# Patient Record
Sex: MALE | Race: BLACK OR AFRICAN AMERICAN | NOT HISPANIC OR LATINO | Employment: FULL TIME | URBAN - METROPOLITAN AREA
[De-identification: names, ages, dates, MRNs, and addresses within clinical notes are randomized per-mention and may not be internally consistent; named-entity substitution may affect disease eponyms.]

---

## 2020-05-08 ENCOUNTER — TELEPHONE (OUTPATIENT)
Dept: ORTHOPEDICS | Facility: CLINIC | Age: 50
End: 2020-05-08

## 2020-05-18 ENCOUNTER — TELEPHONE (OUTPATIENT)
Dept: ORTHOPEDICS | Facility: CLINIC | Age: 50
End: 2020-05-18

## 2020-05-18 NOTE — TELEPHONE ENCOUNTER
We spoke  Told him I got the [employer] entered with no problem  Dr Lang will see him June 5  @ 8 AM  Unable to do May 29   He was ok with that   Slip sent

## 2020-05-19 ENCOUNTER — TELEPHONE (OUTPATIENT)
Dept: ORTHOPEDICS | Facility: CLINIC | Age: 50
End: 2020-05-19

## 2020-05-19 NOTE — TELEPHONE ENCOUNTER
----- Message from Latosha Manriquez LPN sent at 5/19/2020  9:47 AM CDT -----  Contact: self      ----- Message -----  From: Rene Velarde  Sent: 5/19/2020   9:26 AM CDT  To: Rickey Holbrook Staff    Mg Pt need nurse to give him a call concerning having return back to work papers filled out    Contact    he needed my fax # , I told him to fill out his part   He said ok

## 2020-05-28 ENCOUNTER — TELEPHONE (OUTPATIENT)
Dept: ORTHOPEDICS | Facility: CLINIC | Age: 50
End: 2020-05-28

## 2020-05-28 NOTE — TELEPHONE ENCOUNTER
----- Message from Latosha Manriquez LPN sent at 5/27/2020  1:17 PM CDT -----  Contact: self  Spoke with Jaden Reina and he said his job did fax paperwork to the number you provided. I let him know that you will be in tomorrow and can follow up with him, then.   Thank you,  Latosha  [phone]  ----- Message -----  From: Paz Hunt  Sent: 5/27/2020  12:07 PM CDT  To: Rickey Holbrook Staff     Pt is asking for a call back in regards to paper work that was faxed to the office from his job     Contact info- [phone]      we spoke  Informed I DID get his papers but I dont see Dr Lang until & only on Fridays IF he doesn't have lengtheny surgeries   He understood...  I do see him tomorrow

## 2020-06-05 ENCOUNTER — OFFICE VISIT (OUTPATIENT)
Dept: ORTHOPEDICS | Facility: CLINIC | Age: 50
End: 2020-06-05
Payer: COMMERCIAL

## 2020-06-05 VITALS
WEIGHT: 315 LBS | HEART RATE: 90 BPM | DIASTOLIC BLOOD PRESSURE: 80 MMHG | TEMPERATURE: 98 F | BODY MASS INDEX: 44.1 KG/M2 | SYSTOLIC BLOOD PRESSURE: 155 MMHG | HEIGHT: 71 IN

## 2020-06-05 DIAGNOSIS — D48.19 NEOPLASM OF UNCERTAIN BEHAVIOR OF CONNECTIVE AND OTHER SOFT TISSUE: Primary | ICD-10-CM

## 2020-06-05 DIAGNOSIS — R22.42 MASS OF LEFT THIGH: ICD-10-CM

## 2020-06-05 DIAGNOSIS — R26.9 ALTERED GAIT: ICD-10-CM

## 2020-06-05 PROCEDURE — 99999 PR PBB SHADOW E&M-EST. PATIENT-LVL III: ICD-10-PCS | Mod: PBBFAC,,, | Performed by: ORTHOPAEDIC SURGERY

## 2020-06-05 PROCEDURE — 88307 PR  SURG PATH,LEVEL V: ICD-10-PCS | Mod: 26,,, | Performed by: PATHOLOGY

## 2020-06-05 PROCEDURE — 20206 BIOPSY MUSCLE PERQ NEEDLE: CPT | Mod: S$GLB,,, | Performed by: ORTHOPAEDIC SURGERY

## 2020-06-05 PROCEDURE — 88305 TISSUE EXAM BY PATHOLOGIST: CPT | Performed by: PATHOLOGY

## 2020-06-05 PROCEDURE — 99215 OFFICE O/P EST HI 40 MIN: CPT | Mod: 25,S$GLB,, | Performed by: ORTHOPAEDIC SURGERY

## 2020-06-05 PROCEDURE — 20206 PR NEEDLE BIOPSY,MUSCLE: ICD-10-PCS | Mod: S$GLB,,, | Performed by: ORTHOPAEDIC SURGERY

## 2020-06-05 PROCEDURE — 88307 TISSUE EXAM BY PATHOLOGIST: CPT | Mod: 26,,, | Performed by: PATHOLOGY

## 2020-06-05 PROCEDURE — 99215 PR OFFICE/OUTPT VISIT, EST, LEVL V, 40-54 MIN: ICD-10-PCS | Mod: 25,S$GLB,, | Performed by: ORTHOPAEDIC SURGERY

## 2020-06-05 PROCEDURE — 99999 PR PBB SHADOW E&M-EST. PATIENT-LVL III: CPT | Mod: PBBFAC,,, | Performed by: ORTHOPAEDIC SURGERY

## 2020-06-05 NOTE — PROGRESS NOTES
Clinic Note  Orthopaedics    SUBJECTIVE:     History of Present Illness:  Patient is a 49 y.o. male here today to be evaluated for left posterior medial thigh mass.  States that this 1st noticed approximately 6-8 months ago.  He was at work and his coworkers notice that his left leg with more swollen than his right.  Since then he notes that the mass has been slowly growing however has not been causing any pain.  Denies distal numbness, tingling.  Denies paresthesias in the sciatic nerve distribution, states that he has had mild paresthesias anteriorly over the lateral femoral cutaneous nerve distribution.  Denies fevers, chills, malaise, body aches, weight loss, change in appetite, bowel or bladder changes.  States that he does not have any known family history of sarcoma however he will have to discuss this with his family.  He currently works for the [employer].  No other history of personal cancers.  History of hypertension however otherwise no major medical conditions.  He is a nonsmoker, does drink occasional alcohol.    Scheduled Meds:  Continuous Infusions:  PRN Meds:    Review of patient's allergies indicates:  No Known Allergies    Past Medical History:   Diagnosis Date    Hypertension      Past Surgical History:   Procedure Laterality Date    HERNIA REPAIR       No family history on file.  Social History     Tobacco Use    Smoking status: Never Smoker    Smokeless tobacco: Never Used   Substance Use Topics    Alcohol use: Yes     Comment: 1-2 week    Drug use: No        Review of Systems:  Patient denies constitutional symptoms, cardiac symptoms, respiratory symptoms, GI symptoms.  The remainder of the musculoskeletal ROS is included in the HPI.      OBJECTIVE:     Vital Signs (Most Recent)  Temp: 97.5 °F (36.4 °C) (06/05/20 0754)  Pulse: 90 (06/05/20 0754)  BP: (!) 155/80 (06/05/20 0754)    Physical Exam:  Gen:  No acute distress  CV:  Peripherally well-perfused.  Pulses 2+  bilaterally.  Lungs:  Normal respiratory effort.  Abdomen:  Soft, non-tender, non-distended  Head/Neck:  Normocephalic.  Atraumatic. No TTP, AROM and PROM intact without pain  Neuro:  CN intact without deficit, SILT throughout B/L Upper & Lower Extremities      Physical Exam:  Gen:  No acute distress  CV:  Peripherally well-perfused.  Pulses 2+ bilaterally.  Lungs:  Normal respiratory effort.  Abdomen:  Soft, non-tender, non-distended  Head/Neck:  Normocephalic.  Atraumatic. No TTP, AROM and PROM intact without pain  Neuro:  CN intact without deficit, SILT throughout B/L Upper & Lower Extremities  Pelvis: No TTP, Stable to direct anterior pressure over ASIS.      Left LOWER EXTREMITY    INSPECTION  - There is a greater than 20 cm mass noted over the posterior medial aspect of the left thigh.  Spans from the groin to the distal 1/3 of the femur.  This approximately size of a watermelon.  No skin blanching  PALPATION  - On palpation the mass is firm, dense, affixed to this the the deeper structures of the thigh. It is non mobile.  skin is not densely adherent to the mass.  RANGE OF MOTION  - AROM and PROM intact at the knee, hip, ankle painless.  NEUROVASCULAR  - TA/EHL/Gastroc/FHL assessed in isolation without deficit  - SILT throughout  - DP and PT palpated  2+  - Capillary Refill <3s            Laboratory:  No results found for this or any previous visit (from the past 72 hour(s)).    Diagnostic Results:  X-Ray: Reviewed     MRI reviewed showing a greater than 20 cm mass in the medial compartment of the thigh.  Proximally there does appear to be an area of the mass that is primarily undifferentiated lipoma however distally the mass does appear to be de differentiated.  Appears to be primarily located within the adductor brandi and displaces the semimembranosus, semitendinosus laterally.     ASSESSMENT/PLAN:     A/P: Tito Stanley is a 49 y.o. with left posteromedial thigh mass.  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  Core biopsy taken today in the clinic, was able to obtain 10-12 good core biopsy samples which will confirm diagnosis.  Discussed with patient that he will most certainly need surgery for this however it is unclear the timing at this time.  If this does appear to be the diagnosis we expect will plan for radiation and eventual surgery likely in August.  Will order CT chest abdomen and pelvis with contrast for staging he will get this over the next 2 weeks and follow up in 2 weeks to discuss his biopsy results.  Lab work ordered for contrast CT.  [facility] paperwork was filled out today.        Jayce Diop M.D. PGY3  Orthopaedic Surgery

## 2020-06-11 ENCOUNTER — TELEPHONE (OUTPATIENT)
Dept: ORTHOPEDICS | Facility: CLINIC | Age: 50
End: 2020-06-11

## 2020-06-11 NOTE — TELEPHONE ENCOUNTER
We spoke  I had to move his follow up appt to July 10   His biopsy is this Saturday June 13  I'll see if Dr Lang can call him after the results of the BX are available

## 2020-06-13 ENCOUNTER — HOSPITAL ENCOUNTER (OUTPATIENT)
Dept: RADIOLOGY | Facility: HOSPITAL | Age: 50
Discharge: HOME | End: 2020-06-13
Attending: ORTHOPAEDIC SURGERY
Payer: COMMERCIAL

## 2020-06-13 DIAGNOSIS — D48.19 NEOPLASM OF UNCERTAIN BEHAVIOR OF CONNECTIVE AND OTHER SOFT TISSUE: ICD-10-CM

## 2020-06-13 PROCEDURE — 74177 CT ABD & PELVIS W/CONTRAST: CPT | Mod: 26,,, | Performed by: RADIOLOGY

## 2020-06-13 PROCEDURE — 25500020 PHARM REV CODE 255: Performed by: ORTHOPAEDIC SURGERY

## 2020-06-13 PROCEDURE — 71260 CT CHEST ABDOMEN PELVIS WITH CONTRAST (XPD): ICD-10-PCS | Mod: 26,,, | Performed by: RADIOLOGY

## 2020-06-13 PROCEDURE — 71260 CT THORAX DX C+: CPT | Mod: 26,,, | Performed by: RADIOLOGY

## 2020-06-13 PROCEDURE — 74177 CT CHEST ABDOMEN PELVIS WITH CONTRAST (XPD): ICD-10-PCS | Mod: 26,,, | Performed by: RADIOLOGY

## 2020-06-13 PROCEDURE — 74177 CT ABD & PELVIS W/CONTRAST: CPT | Mod: TC

## 2020-06-13 RX ADMIN — IOHEXOL 100 ML: 350 INJECTION, SOLUTION INTRAVENOUS at 11:06

## 2020-06-13 RX ADMIN — IOHEXOL 15 ML: 350 INJECTION, SOLUTION INTRAVENOUS at 10:06

## 2020-06-13 RX ADMIN — IOHEXOL 15 ML: 350 INJECTION, SOLUTION INTRAVENOUS at 09:06

## 2020-06-15 LAB
FINAL PATHOLOGIC DIAGNOSIS: NORMAL
GROSS: NORMAL

## 2020-06-19 NOTE — PROGRESS NOTES
I have reviewed the notes, assessments, and/or procedures performed by Jayce Diop, I concur with her/his documentation of Gladwin Jaden.     Imaging shows a large fatty component of the tumor with a distinct separate enhancing non-lipomatous area, this is highly suspicious for dedifferentiated liposarcoma.    Start staging workup with imaging, and biopsy to be done today.  Anticipate neoadjuvant radiation, resection, given large size >10cm, chemotherapy can be considered.    See back in 4 weeks to discuss pathology results, imaging, and surgical details.    Core needle biopsy procedure note:  Performed with [employer] 16G biopsy needle and 14G trochar using direct posterior approach with patient lying prone.  Skin prepared using sterile technique, and analgesia provided with subcutaneous lidocaine 1% injected using 25G needle.  Several trajectories used, minimal resistance within the tumor, well formed white tissue cores were obtained 10-12 in all and placed in formalin and sent to Pathology.  Pressure applied for 2 minutes, hemostasis obtained, dressing applied.  Patient tolerated procedure well.    I spent more than 60 minutes reviewing this patient's medical records, imaging studies, and taking a full history and physical, and discussing his treatment plan and expected prognosis, and performing core needle biopsy procedure.  More than 50% of this time was spent face to face with the patient.

## 2020-07-07 ENCOUNTER — TELEPHONE (OUTPATIENT)
Dept: ORTHOPEDICS | Facility: CLINIC | Age: 50
End: 2020-07-07

## 2020-07-07 NOTE — TELEPHONE ENCOUNTER
----- Message from Latosha Manriquez LPN sent at 7/2/2020  3:23 PM CDT -----  Contact: Patient @[phone]  [place] -  Mr. Stanley said that the paperwork Dr. Lang filled out has to be redone. Mr. Stanley is going to have a new form faxed and I will give it to you upon receipt. He has an upcoming appt with .  Thank you,Latosha  ----- Message -----  From: Alejo Hood  Sent: 7/2/2020   3:13 PM CDT  To: Rickey Holbrook Staff    Patient requesting a return call regarding the [facility] paperwork, pls call to discuss further                   Informed to bring the papers Friday when he comes in , he said OK

## 2020-07-10 ENCOUNTER — TELEPHONE (OUTPATIENT)
Dept: ORTHOPEDICS | Facility: CLINIC | Age: 50
End: 2020-07-10

## 2020-07-10 ENCOUNTER — HOSPITAL ENCOUNTER (OUTPATIENT)
Dept: CARDIOLOGY | Facility: CLINIC | Age: 50
Discharge: HOME | End: 2020-07-10
Payer: COMMERCIAL

## 2020-07-10 ENCOUNTER — OFFICE VISIT (OUTPATIENT)
Dept: ORTHOPEDICS | Facility: CLINIC | Age: 50
End: 2020-07-10
Payer: COMMERCIAL

## 2020-07-10 ENCOUNTER — HOSPITAL ENCOUNTER (OUTPATIENT)
Dept: RADIOLOGY | Facility: HOSPITAL | Age: 50
Discharge: HOME | End: 2020-07-10
Attending: ORTHOPAEDIC SURGERY
Payer: COMMERCIAL

## 2020-07-10 VITALS
DIASTOLIC BLOOD PRESSURE: 77 MMHG | BODY MASS INDEX: 44.1 KG/M2 | HEART RATE: 84 BPM | TEMPERATURE: 97 F | SYSTOLIC BLOOD PRESSURE: 148 MMHG | HEIGHT: 71 IN | WEIGHT: 315 LBS

## 2020-07-10 DIAGNOSIS — C49.22 SOFT TISSUE SARCOMA OF LEFT LOWER EXTREMITY: ICD-10-CM

## 2020-07-10 DIAGNOSIS — C49.22 SOFT TISSUE SARCOMA OF LEFT LOWER EXTREMITY: Primary | ICD-10-CM

## 2020-07-10 PROCEDURE — 93005 ELECTROCARDIOGRAM TRACING: CPT | Mod: S$GLB,,, | Performed by: ORTHOPAEDIC SURGERY

## 2020-07-10 PROCEDURE — 71046 XR CHEST PA AND LATERAL: ICD-10-PCS | Mod: 26,,, | Performed by: RADIOLOGY

## 2020-07-10 PROCEDURE — 99214 PR OFFICE/OUTPT VISIT, EST, LEVL IV, 30-39 MIN: ICD-10-PCS | Mod: S$GLB,,, | Performed by: ORTHOPAEDIC SURGERY

## 2020-07-10 PROCEDURE — 93010 EKG 12-LEAD: ICD-10-PCS | Mod: S$GLB,,, | Performed by: INTERNAL MEDICINE

## 2020-07-10 PROCEDURE — 99999 PR PBB SHADOW E&M-EST. PATIENT-LVL III: CPT | Mod: PBBFAC,,, | Performed by: ORTHOPAEDIC SURGERY

## 2020-07-10 PROCEDURE — 99999 PR PBB SHADOW E&M-EST. PATIENT-LVL III: ICD-10-PCS | Mod: PBBFAC,,, | Performed by: ORTHOPAEDIC SURGERY

## 2020-07-10 PROCEDURE — 99214 OFFICE O/P EST MOD 30 MIN: CPT | Mod: S$GLB,,, | Performed by: ORTHOPAEDIC SURGERY

## 2020-07-10 PROCEDURE — 93005 EKG 12-LEAD: ICD-10-PCS | Mod: S$GLB,,, | Performed by: ORTHOPAEDIC SURGERY

## 2020-07-10 PROCEDURE — 71046 X-RAY EXAM CHEST 2 VIEWS: CPT | Mod: 26,,, | Performed by: RADIOLOGY

## 2020-07-10 PROCEDURE — 71046 X-RAY EXAM CHEST 2 VIEWS: CPT | Mod: TC,FY

## 2020-07-10 PROCEDURE — 93010 ELECTROCARDIOGRAM REPORT: CPT | Mod: S$GLB,,, | Performed by: INTERNAL MEDICINE

## 2020-07-10 RX ORDER — NAPROXEN SODIUM 220 MG
220 TABLET ORAL 2 TIMES DAILY WITH MEALS
Status: ON HOLD | COMMUNITY
End: 2020-07-25 | Stop reason: HOSPADM

## 2020-07-10 NOTE — TELEPHONE ENCOUNTER
I spoke with Mr Jaden  Regarding his work papers he brought back stating they were not done correctly , Dr Lang looked over them carefully   Everything looked good to him   Mr Stanley will have work fax the papers to me

## 2020-07-10 NOTE — H&P
Clinic Note  Orthopaedics    SUBJECTIVE:     History of Present Illness:  Patient is a 49 y.o. male here today to be evaluated for left posterior medial thigh mass.  States that this 1st noticed approximately 6-8 months ago.  He was at work and his coworkers notice that his left leg with more swollen than his right.  Since then he notes that the mass has been slowly growing however has not been causing any pain.  Denies distal numbness, tingling.  Denies paresthesias in the sciatic nerve distribution, states that he has had mild paresthesias anteriorly over the lateral femoral cutaneous nerve distribution.  Denies fevers, chills, malaise, body aches, weight loss, change in appetite, bowel or bladder changes.  States that he does not have any known family history of sarcoma however he will have to discuss this with his family.  He currently works for the [employer].  No other history of personal cancers.  History of hypertension however otherwise no major medical conditions.  He is a nonsmoker, does drink occasional alcohol.    Interval History 7/10/20: Path showed -Mostly acellular necrotic tissue with focal myxoid stroma and poorly preserved atypical spindle cells, suspicious for, but not diagnostic of a soft tissue neoplasm (see comment). In a radiographic setting which is suspicious for a lipocytic neoplasm, these microscopic findings could represent a necrotic area of a myxoid or well-differentiated liposarcoma, but no definitive viable morphology is present.  He denies any interval change to his thigh mass.  Denies any B symptoms.    Scheduled Meds:  Continuous Infusions:  PRN Meds:    Review of patient's allergies indicates:  No Known Allergies    Past Medical History:   Diagnosis Date    Hypertension      Past Surgical History:   Procedure Laterality Date    HERNIA REPAIR       No family history on file.  Social History     Tobacco Use    Smoking status: Never Smoker    Smokeless tobacco: Never Used    Substance Use Topics    Alcohol use: Yes     Comment: 1-2 week    Drug use: No        Review of Systems:  Patient denies constitutional symptoms, cardiac symptoms, respiratory symptoms, GI symptoms.  The remainder of the musculoskeletal ROS is included in the HPI.      OBJECTIVE:     Vital Signs (Most Recent)  Temp: 97.4 °F (36.3 °C) (07/10/20 0802)  Pulse: 84 (07/10/20 0802)  BP: (!) 148/77 (07/10/20 0802)    Physical Exam:  Gen:  No acute distress  CV:  Peripherally well-perfused.  Pulses 2+ bilaterally.  Lungs:  Normal respiratory effort.  Abdomen:  Soft, non-tender, non-distended  Head/Neck:  Normocephalic.  Atraumatic. No TTP, AROM and PROM intact without pain  Neuro:  CN intact without deficit, SILT throughout B/L Upper & Lower Extremities      Physical Exam:  Gen:  No acute distress  CV:  Peripherally well-perfused.  Pulses 2+ bilaterally.  Lungs:  Normal respiratory effort.  Abdomen:  Soft, non-tender, non-distended  Head/Neck:  Normocephalic.  Atraumatic. No TTP, AROM and PROM intact without pain  Neuro:  CN intact without deficit, SILT throughout B/L Upper & Lower Extremities  Pelvis: No TTP, Stable to direct anterior pressure over ASIS.      Left LOWER EXTREMITY    INSPECTION  - There is a greater than 20 cm mass noted over the posterior medial aspect of the left thigh.  Spans from the groin to the distal 1/3 of the femur.  This approximately size of a watermelon.  No skin blanching  PALPATION  - On palpation the mass is firm, dense, affixed to this the the deeper structures of the thigh. It is non mobile.  skin is not densely adherent to the mass.  RANGE OF MOTION  - AROM and PROM intact at the knee, hip, ankle painless.  NEUROVASCULAR  - TA/EHL/Gastroc/FHL assessed in isolation without deficit  - SILT throughout  - DP and PT palpated  2+  - Capillary Refill <3s            Laboratory:  No results found for this or any previous visit (from the past 72 hour(s)).    Diagnostic Results:  X-Ray: Reviewed      MRI reviewed showing a greater than 20 cm mass in the medial compartment of the thigh.  Proximally there does appear to be an area of the mass that is primarily undifferentiated lipoma however distally the mass does appear to be de differentiated.  Appears to be primarily located within the adductor brandi and displaces the semimembranosus, semitendinosus laterally.     ASSESSMENT/PLAN:     A/P: Tito Stanley is a 49 y.o. 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     - Given the size of the lesion as well as the inability to determine definitive diagnosis, we will plan for radical resection L thigh sarcoma on 7/24/20.  The risks and benefits of the surgery well as other options were discussed the patient, and he agreed with proceeding with surgery.  Surgery consents were signed and the case was booked during this visit.  Will need drains and prevena given the size of the tumor in the amount of dead space lb present upon removal. Plan for WBAT LLE.     Risks and complications were discussed including but not limited to the risks of anesthetic complications, infection, wound healing complications, non-union, mal-union, hardware failure, pain, stiffness, DVT, pulmonary embolism, perioperative medical risks (cardiac, pulmonary, renal, neurologic), and death among others were discussed. No guarantees were made and the patient and family elect to proceed. All questions were answered.  No guarantees were implied or stated.  Informed consent was obtained.    Calvin Mckinnon M.D. PGY2  Orthopaedic Surgery

## 2020-07-10 NOTE — PROGRESS NOTES
Clinic Note  Orthopaedics    SUBJECTIVE:     History of Present Illness:  Patient is a 49 y.o. male here today to be evaluated for left posterior medial thigh mass.  States that this 1st noticed approximately 6-8 months ago.  He was at work and his coworkers notice that his left leg with more swollen than his right.  Since then he notes that the mass has been slowly growing however has not been causing any pain.  Denies distal numbness, tingling.  Denies paresthesias in the sciatic nerve distribution, states that he has had mild paresthesias anteriorly over the lateral femoral cutaneous nerve distribution.  Denies fevers, chills, malaise, body aches, weight loss, change in appetite, bowel or bladder changes.  States that he does not have any known family history of sarcoma however he will have to discuss this with his family.  He currently works for the [employer].  No other history of personal cancers.  History of hypertension however otherwise no major medical conditions.  He is a nonsmoker, does drink occasional alcohol.    Interval History 7/10/20: Path showed -Mostly acellular necrotic tissue with focal myxoid stroma and poorly preserved atypical spindle cells, suspicious for, but not diagnostic of a soft tissue neoplasm (see comment). In a radiographic setting which is suspicious for a lipocytic neoplasm, these microscopic findings could represent a necrotic area of a myxoid or well-differentiated liposarcoma, but no definitive viable morphology is present.  He denies any interval change to his thigh mass.  Denies any B symptoms.    Scheduled Meds:  Continuous Infusions:  PRN Meds:    Review of patient's allergies indicates:  No Known Allergies    Past Medical History:   Diagnosis Date    Hypertension      Past Surgical History:   Procedure Laterality Date    HERNIA REPAIR       No family history on file.  Social History     Tobacco Use    Smoking status: Never Smoker    Smokeless tobacco: Never Used    Substance Use Topics    Alcohol use: Yes     Comment: 1-2 week    Drug use: No        Review of Systems:  Patient denies constitutional symptoms, cardiac symptoms, respiratory symptoms, GI symptoms.  The remainder of the musculoskeletal ROS is included in the HPI.      OBJECTIVE:     Vital Signs (Most Recent)  Temp: 97.4 °F (36.3 °C) (07/10/20 0802)  Pulse: 84 (07/10/20 0802)  BP: (!) 148/77 (07/10/20 0802)    Physical Exam:  Gen:  No acute distress  CV:  Peripherally well-perfused.  Pulses 2+ bilaterally.  Lungs:  Normal respiratory effort.  Abdomen:  Soft, non-tender, non-distended  Head/Neck:  Normocephalic.  Atraumatic. No TTP, AROM and PROM intact without pain  Neuro:  CN intact without deficit, SILT throughout B/L Upper & Lower Extremities      Physical Exam:  Gen:  No acute distress  CV:  Peripherally well-perfused.  Pulses 2+ bilaterally.  Lungs:  Normal respiratory effort.  Abdomen:  Soft, non-tender, non-distended  Head/Neck:  Normocephalic.  Atraumatic. No TTP, AROM and PROM intact without pain  Neuro:  CN intact without deficit, SILT throughout B/L Upper & Lower Extremities  Pelvis: No TTP, Stable to direct anterior pressure over ASIS.      Left LOWER EXTREMITY    INSPECTION  - There is a greater than 20 cm mass noted over the posterior medial aspect of the left thigh.  Spans from the groin to the distal 1/3 of the femur.  This approximately size of a watermelon.  No skin blanching  PALPATION  - On palpation the mass is firm, dense, affixed to this the the deeper structures of the thigh. It is non mobile.  skin is not densely adherent to the mass.  RANGE OF MOTION  - AROM and PROM intact at the knee, hip, ankle painless.  NEUROVASCULAR  - TA/EHL/Gastroc/FHL assessed in isolation without deficit  - SILT throughout  - DP and PT palpated  2+  - Capillary Refill <3s            Laboratory:  No results found for this or any previous visit (from the past 72 hour(s)).    Diagnostic Results:  X-Ray: Reviewed      MRI reviewed showing a greater than 20 cm mass in the medial compartment of the thigh.  Proximally there does appear to be an area of the mass that is primarily undifferentiated lipoma however distally the mass does appear to be de differentiated.  Appears to be primarily located within the adductor brandi and displaces the semimembranosus, semitendinosus laterally.     ASSESSMENT/PLAN:     A/P: Tito Stanley is a 49 y.o. 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     - Given the size of the lesion as well as the inability to determine definitive diagnosis, we will plan for radical resection L thigh sarcoma on 7/24/20.  The risks and benefits of the surgery well as other options were discussed the patient, and he agreed with proceeding with surgery.  Surgery consents were signed and the case was booked during this visit.  Will need drains and prevena given the size of the tumor in the amount of dead space lb present upon removal. Plan for WBAT LLE.     Risks and complications were discussed including but not limited to the risks of anesthetic complications, infection, wound healing complications, iatrogenic fracture, pain, stiffness, DVT, pulmonary embolism, damage to arteries, veins, nerves, perioperative medical risks (cardiac, pulmonary, renal, neurologic), and death among others were discussed.  Given the patient's history of cancer the patient was made aware that he is more susceptible to the risks and complications discussed above compared to the general population.  No guarantees were made and the patient and family elect to proceed. All questions were answered.  No guarantees were implied or stated.  Informed consent was obtained.    Calvin Mciknnon M.D. PGY2  Orthopaedic Surgery

## 2020-07-10 NOTE — H&P (VIEW-ONLY)
Clinic Note  Orthopaedics    SUBJECTIVE:     History of Present Illness:  Patient is a 49 y.o. male here today to be evaluated for left posterior medial thigh mass.  States that this 1st noticed approximately 6-8 months ago.  He was at work and his coworkers notice that his left leg with more swollen than his right.  Since then he notes that the mass has been slowly growing however has not been causing any pain.  Denies distal numbness, tingling.  Denies paresthesias in the sciatic nerve distribution, states that he has had mild paresthesias anteriorly over the lateral femoral cutaneous nerve distribution.  Denies fevers, chills, malaise, body aches, weight loss, change in appetite, bowel or bladder changes.  States that he does not have any known family history of sarcoma however he will have to discuss this with his family.  He currently works for the [employer].  No other history of personal cancers.  History of hypertension however otherwise no major medical conditions.  He is a nonsmoker, does drink occasional alcohol.    Interval History 7/10/20: Path showed -Mostly acellular necrotic tissue with focal myxoid stroma and poorly preserved atypical spindle cells, suspicious for, but not diagnostic of a soft tissue neoplasm (see comment). In a radiographic setting which is suspicious for a lipocytic neoplasm, these microscopic findings could represent a necrotic area of a myxoid or well-differentiated liposarcoma, but no definitive viable morphology is present.  He denies any interval change to his thigh mass.  Denies any B symptoms.    Scheduled Meds:  Continuous Infusions:  PRN Meds:    Review of patient's allergies indicates:  No Known Allergies    Past Medical History:   Diagnosis Date    Hypertension      Past Surgical History:   Procedure Laterality Date    HERNIA REPAIR       No family history on file.  Social History     Tobacco Use    Smoking status: Never Smoker    Smokeless tobacco: Never Used    Substance Use Topics    Alcohol use: Yes     Comment: 1-2 week    Drug use: No        Review of Systems:  Patient denies constitutional symptoms, cardiac symptoms, respiratory symptoms, GI symptoms.  The remainder of the musculoskeletal ROS is included in the HPI.      OBJECTIVE:     Vital Signs (Most Recent)  Temp: 97.4 °F (36.3 °C) (07/10/20 0802)  Pulse: 84 (07/10/20 0802)  BP: (!) 148/77 (07/10/20 0802)    Physical Exam:  Gen:  No acute distress  CV:  Peripherally well-perfused.  Pulses 2+ bilaterally.  Lungs:  Normal respiratory effort.  Abdomen:  Soft, non-tender, non-distended  Head/Neck:  Normocephalic.  Atraumatic. No TTP, AROM and PROM intact without pain  Neuro:  CN intact without deficit, SILT throughout B/L Upper & Lower Extremities      Physical Exam:  Gen:  No acute distress  CV:  Peripherally well-perfused.  Pulses 2+ bilaterally.  Lungs:  Normal respiratory effort.  Abdomen:  Soft, non-tender, non-distended  Head/Neck:  Normocephalic.  Atraumatic. No TTP, AROM and PROM intact without pain  Neuro:  CN intact without deficit, SILT throughout B/L Upper & Lower Extremities  Pelvis: No TTP, Stable to direct anterior pressure over ASIS.      Left LOWER EXTREMITY    INSPECTION  - There is a greater than 20 cm mass noted over the posterior medial aspect of the left thigh.  Spans from the groin to the distal 1/3 of the femur.  This approximately size of a watermelon.  No skin blanching  PALPATION  - On palpation the mass is firm, dense, affixed to this the the deeper structures of the thigh. It is non mobile.  skin is not densely adherent to the mass.  RANGE OF MOTION  - AROM and PROM intact at the knee, hip, ankle painless.  NEUROVASCULAR  - TA/EHL/Gastroc/FHL assessed in isolation without deficit  - SILT throughout  - DP and PT palpated  2+  - Capillary Refill <3s            Laboratory:  No results found for this or any previous visit (from the past 72 hour(s)).    Diagnostic Results:  X-Ray: Reviewed      MRI reviewed showing a greater than 20 cm mass in the medial compartment of the thigh.  Proximally there does appear to be an area of the mass that is primarily undifferentiated lipoma however distally the mass does appear to be de differentiated.  Appears to be primarily located within the adductor brandi and displaces the semimembranosus, semitendinosus laterally.     ASSESSMENT/PLAN:     A/P: Tito Stanley is a 49 y.o. 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     - Given the size of the lesion as well as the inability to determine definitive diagnosis, we will plan for radical resection L thigh sarcoma on 7/24/20.  The risks and benefits of the surgery well as other options were discussed the patient, and he agreed with proceeding with surgery.  Surgery consents were signed and the case was booked during this visit.  Will need drains and prevena given the size of the tumor in the amount of dead space lb present upon removal. Plan for WBAT LLE.     Risks and complications were discussed including but not limited to the risks of anesthetic complications, infection, wound healing complications, iatrogenic fracture, pain, stiffness, DVT, pulmonary embolism, damage to arteries, veins, nerves, perioperative medical risks (cardiac, pulmonary, renal, neurologic), and death among others were discussed.  Given the patient's history of cancer the patient was made aware that he is more susceptible to the risks and complications discussed above compared to the general population.  No guarantees were made and the patient and family elect to proceed. All questions were answered.  No guarantees were implied or stated.  Informed consent was obtained.    Calvin Mckinnon M.D. PGY2  Orthopaedic Surgery

## 2020-07-13 DIAGNOSIS — C49.22 SARCOMA OF THIGH, LEFT: Primary | ICD-10-CM

## 2020-07-13 DIAGNOSIS — Z01.818 PREOP EXAMINATION: Primary | ICD-10-CM

## 2020-07-13 DIAGNOSIS — Z03.818 ENCOUNTER FOR OBSERVATION FOR SUSPECTED EXPOSURE TO OTHER BIOLOGICAL AGENTS RULED OUT: ICD-10-CM

## 2020-07-20 NOTE — PROGRESS NOTES
I have reviewed the notes, assessments, and/or procedures performed by Dr. Mckinnon2, I concur with her/his documentation of Tito Stanley.     Large inner thigh mass >20cm.  Pathology not conclusive for sarcoma but does have atypical cells and spindle cells, verifies that we were in the appropriate are of his tumor rather than the predominantly benign/fatty appearing portion of the mass.  Low grade sarcoma cannot be excluded.    No evidence of metastasis on staging CT scans.    Neoadjuvant radiation not recommended without definitive sarcoma diagnosis, and potential low grade sarcoma rather than intermediate to high grade.    Recommend wide excision to get definitive diagnosis and limit risk of local recurrence.  Discussed need for drains postop, risk to neurovascular structures, and wound complication risk.    I spent more than 25 minutes reviewing this patient's medical records, imaging studies, and taking a full history and physical, and discussing his treatment and surgical plan and expected prognosis.  More than 50% of this time was spent face to face with the patient.

## 2020-07-21 ENCOUNTER — TELEPHONE (OUTPATIENT)
Dept: ORTHOPEDICS | Facility: CLINIC | Age: 50
End: 2020-07-21

## 2020-07-21 ENCOUNTER — LAB VISIT (OUTPATIENT)
Dept: SURGERY | Facility: CLINIC | Age: 50
End: 2020-07-21
Attending: ORTHOPAEDIC SURGERY
Payer: COMMERCIAL

## 2020-07-21 DIAGNOSIS — Z03.818 ENCOUNTER FOR OBSERVATION FOR SUSPECTED EXPOSURE TO OTHER BIOLOGICAL AGENTS RULED OUT: ICD-10-CM

## 2020-07-21 PROCEDURE — U0003 INFECTIOUS AGENT DETECTION BY NUCLEIC ACID (DNA OR RNA); SEVERE ACUTE RESPIRATORY SYNDROME CORONAVIRUS 2 (SARS-COV-2) (CORONAVIRUS DISEASE [COVID-19]), AMPLIFIED PROBE TECHNIQUE, MAKING USE OF HIGH THROUGHPUT TECHNOLOGIES AS DESCRIBED BY CMS-2020-01-R: HCPCS

## 2020-07-21 NOTE — TELEPHONE ENCOUNTER
----- Message from Latosha Manriquez LPN sent at 7/20/2020 10:09 AM CDT -----  Mirtha POTTER for pt that you will follow up with him tomorrow, 7/21/20.  Thank you,  Latosha  ----- Message -----  From: Ailyn Bell  Sent: 7/20/2020   9:38 AM CDT  To: Rickey [facility]brook Staff          Patient is requesting  a call regarding his paperwork for his job    @# [phone]       We spoke  I'm faxing them over now  To [phone] as requested

## 2020-07-22 LAB — SARS-COV-2 RNA RESP QL NAA+PROBE: NOT DETECTED

## 2020-07-23 ENCOUNTER — TELEPHONE (OUTPATIENT)
Dept: ORTHOPEDICS | Facility: CLINIC | Age: 50
End: 2020-07-23

## 2020-07-23 ENCOUNTER — ANESTHESIA EVENT (OUTPATIENT)
Dept: SURGERY | Facility: HOSPITAL | Age: 50
DRG: 464 | End: 2020-07-23
Payer: COMMERCIAL

## 2020-07-23 PROCEDURE — 86901 BLOOD TYPING SEROLOGIC RH(D): CPT

## 2020-07-23 RX ORDER — AMLODIPINE BESYLATE 5 MG/1
5 TABLET ORAL
COMMUNITY
Start: 2020-01-13

## 2020-07-23 RX ORDER — SULFAMETHOXAZOLE AND TRIMETHOPRIM 800; 160 MG/1; MG/1
1 TABLET ORAL DAILY
COMMUNITY
Start: 2020-05-15 | End: 2023-01-05 | Stop reason: CLARIF

## 2020-07-23 RX ORDER — LOSARTAN POTASSIUM 100 MG/1
100 TABLET ORAL
COMMUNITY
Start: 2020-01-13

## 2020-07-23 RX ORDER — METFORMIN HYDROCHLORIDE 500 MG/1
500 TABLET ORAL 2 TIMES DAILY WITH MEALS
COMMUNITY
Start: 2020-01-13 | End: 2023-01-05 | Stop reason: CLARIF

## 2020-07-23 RX ORDER — CLOTRIMAZOLE AND BETAMETHASONE DIPROPIONATE 10; .64 MG/G; MG/G
CREAM TOPICAL
COMMUNITY
Start: 2020-01-13

## 2020-07-23 RX ORDER — HYDROCODONE BITARTRATE AND ACETAMINOPHEN 5; 325 MG/1; MG/1
TABLET ORAL
Status: ON HOLD | COMMUNITY
Start: 2020-04-28 | End: 2020-07-25 | Stop reason: HOSPADM

## 2020-07-23 RX ORDER — BUMETANIDE 1 MG/1
TABLET ORAL
COMMUNITY
Start: 2020-03-12

## 2020-07-23 RX ORDER — IBUPROFEN 800 MG/1
TABLET ORAL
COMMUNITY
Start: 2020-07-09

## 2020-07-23 NOTE — PRE-PROCEDURE INSTRUCTIONS
PREOP INSTRUCTIONS:    No solid food ,milk or milk products for 8 hours prior to procedure.Clear liquids are allowed up to 2 hours before procedure.Clear liquids are:water,apple juice,gatorade & powerade.Shower instructions as well as directions to the Day of Surgery Center were given.Patient encouraged to wear loose fitting,comfortable clothing.Medication instructions for pm prior to and am of procedure reviewed.Instructed patient to avoid taking vitamins,supplements,aspirin and ibuprofen the morning of surgery. Patient stated an understanding.Patient instructed to take temperature the night before surgery as well as the morning of surgery and to notify [facility] at [phone] if it is 100.4 or above.Patient also informed of the current visitor policy and advised patient that one visitor may accompany them into the hospital and wait (socially distanced) .When they enter the hospital both patient and visitor will have their temperature checked,provided a mask and provided assistance to their destination.     Inpatients are allowed 1 visitor/day from 8 am until 6 pm.     Covid screening completed 7/21/2020 and results were negative.     Patient denies any side effects or issues with anesthesia or sedation.    WIFE - NONA MARSHALL WILL BE PROVIDING TRANSPORTATION HOME UPON DISCHARGE.

## 2020-07-23 NOTE — TELEPHONE ENCOUNTER
we spoke : Reminded to eat light the night before surgery, NPO after midnight, take hibclens shower the night before surgery  & again in the am , sleep on clean sheets  in clean clothes, no laxatives or stool softeners , report to the 2nd floor surgery unit for 7 am tomorrow  Morning  He voiced understanding

## 2020-07-24 ENCOUNTER — HOSPITAL ENCOUNTER (INPATIENT)
Facility: HOSPITAL | Age: 50
LOS: 1 days | Discharge: HOME | DRG: 464 | End: 2020-07-25
Attending: ORTHOPAEDIC SURGERY | Admitting: ORTHOPAEDIC SURGERY
Payer: COMMERCIAL

## 2020-07-24 ENCOUNTER — ANESTHESIA (OUTPATIENT)
Dept: SURGERY | Facility: HOSPITAL | Age: 50
DRG: 464 | End: 2020-07-24
Payer: COMMERCIAL

## 2020-07-24 DIAGNOSIS — C49.22 SARCOMA OF THIGH, LEFT: ICD-10-CM

## 2020-07-24 DIAGNOSIS — D48.19 NEOPLASM OF UNCERTAIN BEHAVIOR OF CONNECTIVE AND OTHER SOFT TISSUE: Primary | ICD-10-CM

## 2020-07-24 LAB
BASOPHILS # BLD AUTO: 0.02 K/UL (ref 0–0.2)
BASOPHILS NFR BLD: 0.2 % (ref 0–1.9)
DIFFERENTIAL METHOD: ABNORMAL
EOSINOPHIL # BLD AUTO: 0 K/UL (ref 0–0.5)
EOSINOPHIL NFR BLD: 0 % (ref 0–8)
ERYTHROCYTE [DISTWIDTH] IN BLOOD BY AUTOMATED COUNT: 17.7 % (ref 11.5–14.5)
HCT VFR BLD AUTO: 27.5 % (ref 40–54)
HGB BLD-MCNC: 8.3 G/DL (ref 14–18)
IMM GRANULOCYTES # BLD AUTO: 0.04 K/UL (ref 0–0.04)
IMM GRANULOCYTES NFR BLD AUTO: 0.4 % (ref 0–0.5)
LYMPHOCYTES # BLD AUTO: 1.2 K/UL (ref 1–4.8)
LYMPHOCYTES NFR BLD: 10.9 % (ref 18–48)
MCH RBC QN AUTO: 25.2 PG (ref 27–31)
MCHC RBC AUTO-ENTMCNC: 30.2 G/DL (ref 32–36)
MCV RBC AUTO: 84 FL (ref 82–98)
MONOCYTES # BLD AUTO: 0.3 K/UL (ref 0.3–1)
MONOCYTES NFR BLD: 2.7 % (ref 4–15)
NEUTROPHILS # BLD AUTO: 9.5 K/UL (ref 1.8–7.7)
NEUTROPHILS NFR BLD: 85.8 % (ref 38–73)
NRBC BLD-RTO: 0 /100 WBC
PLATELET # BLD AUTO: 450 K/UL (ref 150–350)
PMV BLD AUTO: 8.4 FL (ref 9.2–12.9)
POCT GLUCOSE: 182 MG/DL (ref 70–110)
POCT GLUCOSE: 186 MG/DL (ref 70–110)
POCT GLUCOSE: 98 MG/DL (ref 70–110)
RBC # BLD AUTO: 3.29 M/UL (ref 4.6–6.2)
WBC # BLD AUTO: 11.11 K/UL (ref 3.9–12.7)

## 2020-07-24 PROCEDURE — 64447: ICD-10-PCS | Mod: 59,LT,, | Performed by: ANESTHESIOLOGY

## 2020-07-24 PROCEDURE — 64447 NJX AA&/STRD FEMORAL NRV IMG: CPT | Mod: 59,LT,, | Performed by: ANESTHESIOLOGY

## 2020-07-24 PROCEDURE — 88271 CYTOGENETICS DNA PROBE: CPT | Mod: 59 | Performed by: PATHOLOGY

## 2020-07-24 PROCEDURE — 76942: ICD-10-PCS | Mod: 26,,, | Performed by: ANESTHESIOLOGY

## 2020-07-24 PROCEDURE — 88331 PATH CONSLTJ SURG 1 BLK 1SPC: CPT | Mod: 59 | Performed by: PATHOLOGY

## 2020-07-24 PROCEDURE — 85025 COMPLETE CBC W/AUTO DIFF WBC: CPT

## 2020-07-24 PROCEDURE — 76942 ECHO GUIDE FOR BIOPSY: CPT | Mod: 26,,, | Performed by: ANESTHESIOLOGY

## 2020-07-24 PROCEDURE — 88275 CYTOGENETICS 100-300: CPT | Performed by: PATHOLOGY

## 2020-07-24 PROCEDURE — 64445 NJX AA&/STRD SCIATIC NRV IMG: CPT | Performed by: STUDENT IN AN ORGANIZED HEALTH CARE EDUCATION/TRAINING PROGRAM

## 2020-07-24 PROCEDURE — 94761 N-INVAS EAR/PLS OXIMETRY MLT: CPT

## 2020-07-24 PROCEDURE — 37000008 HC ANESTHESIA 1ST 15 MINUTES: Performed by: ORTHOPAEDIC SURGERY

## 2020-07-24 PROCEDURE — D9220A PRA ANESTHESIA: Mod: ,,, | Performed by: ANESTHESIOLOGY

## 2020-07-24 PROCEDURE — 88332 PATH CONSLTJ SURG EA ADD BLK: CPT | Performed by: PATHOLOGY

## 2020-07-24 PROCEDURE — 63600175 PHARM REV CODE 636 W HCPCS: Performed by: STUDENT IN AN ORGANIZED HEALTH CARE EDUCATION/TRAINING PROGRAM

## 2020-07-24 PROCEDURE — 64447 NJX AA&/STRD FEMORAL NRV IMG: CPT | Performed by: STUDENT IN AN ORGANIZED HEALTH CARE EDUCATION/TRAINING PROGRAM

## 2020-07-24 PROCEDURE — D9220A PRA ANESTHESIA: ICD-10-PCS | Mod: ,,, | Performed by: ANESTHESIOLOGY

## 2020-07-24 PROCEDURE — 71000016 HC POSTOP RECOV ADDL HR: Performed by: ORTHOPAEDIC SURGERY

## 2020-07-24 PROCEDURE — 88331 PATH CONSLTJ SURG 1 BLK 1SPC: CPT | Mod: 26,,, | Performed by: PATHOLOGY

## 2020-07-24 PROCEDURE — 88307 PR  SURG PATH,LEVEL V: ICD-10-PCS | Mod: 26,,, | Performed by: PATHOLOGY

## 2020-07-24 PROCEDURE — 25000003 PHARM REV CODE 250: Performed by: REGISTERED NURSE

## 2020-07-24 PROCEDURE — 88331 PR  PATH CONSULT IN SURG,W FRZ SEC: ICD-10-PCS | Mod: 26,,, | Performed by: PATHOLOGY

## 2020-07-24 PROCEDURE — 25000003 PHARM REV CODE 250: Performed by: STUDENT IN AN ORGANIZED HEALTH CARE EDUCATION/TRAINING PROGRAM

## 2020-07-24 PROCEDURE — 37000009 HC ANESTHESIA EA ADD 15 MINS: Performed by: ORTHOPAEDIC SURGERY

## 2020-07-24 PROCEDURE — 88305 TISSUE EXAM BY PATHOLOGIST: CPT | Mod: 59 | Performed by: PATHOLOGY

## 2020-07-24 PROCEDURE — 88305 TISSUE EXAM BY PATHOLOGIST: CPT | Mod: 26,,, | Performed by: PATHOLOGY

## 2020-07-24 PROCEDURE — 88274 CYTOGENETICS 25-99: CPT | Performed by: PATHOLOGY

## 2020-07-24 PROCEDURE — 11000001 HC ACUTE MED/SURG PRIVATE ROOM

## 2020-07-24 PROCEDURE — 63600175 PHARM REV CODE 636 W HCPCS: Performed by: NURSE ANESTHETIST, CERTIFIED REGISTERED

## 2020-07-24 PROCEDURE — 64445 NJX AA&/STRD SCIATIC NRV IMG: CPT | Mod: 59,LT,, | Performed by: ANESTHESIOLOGY

## 2020-07-24 PROCEDURE — 71000033 HC RECOVERY, INTIAL HOUR: Performed by: ORTHOPAEDIC SURGERY

## 2020-07-24 PROCEDURE — 88307 TISSUE EXAM BY PATHOLOGIST: CPT | Performed by: PATHOLOGY

## 2020-07-24 PROCEDURE — 82962 GLUCOSE BLOOD TEST: CPT | Performed by: ORTHOPAEDIC SURGERY

## 2020-07-24 PROCEDURE — 88332 PR  PATH CONSULT IN SURG,W ADDN FRZ SEC: ICD-10-PCS | Mod: 26,,, | Performed by: PATHOLOGY

## 2020-07-24 PROCEDURE — 63600175 PHARM REV CODE 636 W HCPCS: Performed by: REGISTERED NURSE

## 2020-07-24 PROCEDURE — 36000707: Performed by: ORTHOPAEDIC SURGERY

## 2020-07-24 PROCEDURE — 88332 PATH CONSLTJ SURG EA ADD BLK: CPT | Mod: 26,,, | Performed by: PATHOLOGY

## 2020-07-24 PROCEDURE — 64445 SCIATIC NERVE SINGLE INJECTION BLOCK: ICD-10-PCS | Mod: 59,LT,, | Performed by: ANESTHESIOLOGY

## 2020-07-24 PROCEDURE — 88307 TISSUE EXAM BY PATHOLOGIST: CPT | Mod: 26,,, | Performed by: PATHOLOGY

## 2020-07-24 PROCEDURE — 25000003 PHARM REV CODE 250: Performed by: NURSE ANESTHETIST, CERTIFIED REGISTERED

## 2020-07-24 PROCEDURE — 71000015 HC POSTOP RECOV 1ST HR: Performed by: ORTHOPAEDIC SURGERY

## 2020-07-24 PROCEDURE — 88305 TISSUE EXAM BY PATHOLOGIST: ICD-10-PCS | Mod: 26,,, | Performed by: PATHOLOGY

## 2020-07-24 PROCEDURE — 36000706: Performed by: ORTHOPAEDIC SURGERY

## 2020-07-24 PROCEDURE — 63600175 PHARM REV CODE 636 W HCPCS: Performed by: ANESTHESIOLOGY

## 2020-07-24 PROCEDURE — 27201423 OPTIME MED/SURG SUP & DEVICES STERILE SUPPLY: Performed by: ORTHOPAEDIC SURGERY

## 2020-07-24 RX ORDER — DEXAMETHASONE SODIUM PHOSPHATE 4 MG/ML
INJECTION, SOLUTION INTRA-ARTICULAR; INTRALESIONAL; INTRAMUSCULAR; INTRAVENOUS; SOFT TISSUE
Status: DISCONTINUED | OUTPATIENT
Start: 2020-07-24 | End: 2020-07-24

## 2020-07-24 RX ORDER — LIDOCAINE HYDROCHLORIDE 20 MG/ML
INJECTION INTRAVENOUS
Status: DISCONTINUED | OUTPATIENT
Start: 2020-07-24 | End: 2020-07-24

## 2020-07-24 RX ORDER — OXYCODONE HYDROCHLORIDE 5 MG/1
5 TABLET ORAL EVERY 4 HOURS PRN
Status: DISCONTINUED | OUTPATIENT
Start: 2020-07-24 | End: 2020-07-25 | Stop reason: HOSPADM

## 2020-07-24 RX ORDER — EPHEDRINE SULFATE 50 MG/ML
INJECTION, SOLUTION INTRAVENOUS
Status: DISCONTINUED | OUTPATIENT
Start: 2020-07-24 | End: 2020-07-24

## 2020-07-24 RX ORDER — CLINDAMYCIN PHOSPHATE 900 MG/50ML
900 INJECTION, SOLUTION INTRAVENOUS
Status: DISCONTINUED | OUTPATIENT
Start: 2020-07-24 | End: 2020-07-25 | Stop reason: HOSPADM

## 2020-07-24 RX ORDER — FENTANYL CITRATE 50 UG/ML
25 INJECTION, SOLUTION INTRAMUSCULAR; INTRAVENOUS EVERY 5 MIN PRN
Status: DISCONTINUED | OUTPATIENT
Start: 2020-07-24 | End: 2020-07-24 | Stop reason: HOSPADM

## 2020-07-24 RX ORDER — ACETAMINOPHEN 325 MG/1
650 TABLET ORAL EVERY 4 HOURS PRN
Status: DISCONTINUED | OUTPATIENT
Start: 2020-07-24 | End: 2020-07-25 | Stop reason: HOSPADM

## 2020-07-24 RX ORDER — ACETAMINOPHEN 10 MG/ML
INJECTION, SOLUTION INTRAVENOUS
Status: DISCONTINUED | OUTPATIENT
Start: 2020-07-24 | End: 2020-07-24

## 2020-07-24 RX ORDER — OXYCODONE HYDROCHLORIDE 10 MG/1
10 TABLET ORAL EVERY 4 HOURS PRN
Status: DISCONTINUED | OUTPATIENT
Start: 2020-07-24 | End: 2020-07-25 | Stop reason: HOSPADM

## 2020-07-24 RX ORDER — INSULIN ASPART 100 [IU]/ML
0-5 INJECTION, SOLUTION INTRAVENOUS; SUBCUTANEOUS
Status: DISCONTINUED | OUTPATIENT
Start: 2020-07-24 | End: 2020-07-25 | Stop reason: HOSPADM

## 2020-07-24 RX ORDER — GLUCAGON 1 MG
1 KIT INJECTION
Status: DISCONTINUED | OUTPATIENT
Start: 2020-07-24 | End: 2020-07-25 | Stop reason: HOSPADM

## 2020-07-24 RX ORDER — TRANEXAMIC ACID 100 MG/ML
INJECTION, SOLUTION INTRAVENOUS
Status: DISCONTINUED | OUTPATIENT
Start: 2020-07-24 | End: 2020-07-24

## 2020-07-24 RX ORDER — IBUPROFEN 200 MG
24 TABLET ORAL
Status: DISCONTINUED | OUTPATIENT
Start: 2020-07-24 | End: 2020-07-25 | Stop reason: HOSPADM

## 2020-07-24 RX ORDER — MIDAZOLAM HYDROCHLORIDE 1 MG/ML
0.5 INJECTION INTRAMUSCULAR; INTRAVENOUS
Status: DISCONTINUED | OUTPATIENT
Start: 2020-07-24 | End: 2020-07-24 | Stop reason: HOSPADM

## 2020-07-24 RX ORDER — SODIUM CHLORIDE 9 MG/ML
INJECTION, SOLUTION INTRAVENOUS CONTINUOUS PRN
Status: DISCONTINUED | OUTPATIENT
Start: 2020-07-24 | End: 2020-07-24

## 2020-07-24 RX ORDER — PROPOFOL 10 MG/ML
VIAL (ML) INTRAVENOUS
Status: DISCONTINUED | OUTPATIENT
Start: 2020-07-24 | End: 2020-07-24

## 2020-07-24 RX ORDER — BUMETANIDE 1 MG/1
1 TABLET ORAL DAILY
Status: DISCONTINUED | OUTPATIENT
Start: 2020-07-25 | End: 2020-07-25 | Stop reason: HOSPADM

## 2020-07-24 RX ORDER — AMLODIPINE BESYLATE 5 MG/1
5 TABLET ORAL DAILY
Status: DISCONTINUED | OUTPATIENT
Start: 2020-07-25 | End: 2020-07-25 | Stop reason: HOSPADM

## 2020-07-24 RX ORDER — MUPIROCIN 20 MG/G
OINTMENT TOPICAL 2 TIMES DAILY
Status: DISCONTINUED | OUTPATIENT
Start: 2020-07-24 | End: 2020-07-25 | Stop reason: HOSPADM

## 2020-07-24 RX ORDER — DEXMEDETOMIDINE HYDROCHLORIDE 100 UG/ML
INJECTION, SOLUTION INTRAVENOUS
Status: DISCONTINUED | OUTPATIENT
Start: 2020-07-24 | End: 2020-07-24

## 2020-07-24 RX ORDER — SODIUM CHLORIDE 0.9 % (FLUSH) 0.9 %
10 SYRINGE (ML) INJECTION
Status: DISCONTINUED | OUTPATIENT
Start: 2020-07-24 | End: 2020-07-25 | Stop reason: HOSPADM

## 2020-07-24 RX ORDER — LOSARTAN POTASSIUM 25 MG/1
100 TABLET ORAL DAILY
Status: DISCONTINUED | OUTPATIENT
Start: 2020-07-25 | End: 2020-07-25 | Stop reason: HOSPADM

## 2020-07-24 RX ORDER — CEFAZOLIN SODIUM 1 G/3ML
2 INJECTION, POWDER, FOR SOLUTION INTRAMUSCULAR; INTRAVENOUS
Status: COMPLETED | OUTPATIENT
Start: 2020-07-24 | End: 2020-07-25

## 2020-07-24 RX ORDER — CEFAZOLIN SODIUM 1 G/3ML
INJECTION, POWDER, FOR SOLUTION INTRAMUSCULAR; INTRAVENOUS
Status: DISCONTINUED | OUTPATIENT
Start: 2020-07-24 | End: 2020-07-24

## 2020-07-24 RX ORDER — ENOXAPARIN SODIUM 100 MG/ML
40 INJECTION SUBCUTANEOUS
Status: DISCONTINUED | OUTPATIENT
Start: 2020-07-24 | End: 2020-07-25 | Stop reason: HOSPADM

## 2020-07-24 RX ORDER — IBUPROFEN 200 MG
16 TABLET ORAL
Status: DISCONTINUED | OUTPATIENT
Start: 2020-07-24 | End: 2020-07-25 | Stop reason: HOSPADM

## 2020-07-24 RX ORDER — FENTANYL CITRATE 50 UG/ML
25 INJECTION, SOLUTION INTRAMUSCULAR; INTRAVENOUS EVERY 5 MIN PRN
Status: COMPLETED | OUTPATIENT
Start: 2020-07-24 | End: 2020-07-24

## 2020-07-24 RX ORDER — BUPIVACAINE HYDROCHLORIDE 2.5 MG/ML
INJECTION, SOLUTION EPIDURAL; INFILTRATION; INTRACAUDAL
Status: DISCONTINUED | OUTPATIENT
Start: 2020-07-24 | End: 2020-07-24

## 2020-07-24 RX ORDER — ROCURONIUM BROMIDE 10 MG/ML
INJECTION, SOLUTION INTRAVENOUS
Status: DISCONTINUED | OUTPATIENT
Start: 2020-07-24 | End: 2020-07-24

## 2020-07-24 RX ORDER — ONDANSETRON 2 MG/ML
INJECTION INTRAMUSCULAR; INTRAVENOUS
Status: DISCONTINUED | OUTPATIENT
Start: 2020-07-24 | End: 2020-07-24

## 2020-07-24 RX ORDER — SODIUM CHLORIDE 0.9 % (FLUSH) 0.9 %
10 SYRINGE (ML) INJECTION
Status: DISCONTINUED | OUTPATIENT
Start: 2020-07-24 | End: 2020-07-24 | Stop reason: HOSPADM

## 2020-07-24 RX ORDER — KETAMINE HCL IN 0.9 % NACL 50 MG/5 ML
SYRINGE (ML) INTRAVENOUS
Status: DISCONTINUED | OUTPATIENT
Start: 2020-07-24 | End: 2020-07-24

## 2020-07-24 RX ORDER — GLYCOPYRROLATE 0.2 MG/ML
INJECTION INTRAMUSCULAR; INTRAVENOUS
Status: DISCONTINUED | OUTPATIENT
Start: 2020-07-24 | End: 2020-07-24

## 2020-07-24 RX ORDER — ONDANSETRON 2 MG/ML
4 INJECTION INTRAMUSCULAR; INTRAVENOUS EVERY 12 HOURS PRN
Status: DISCONTINUED | OUTPATIENT
Start: 2020-07-24 | End: 2020-07-25 | Stop reason: HOSPADM

## 2020-07-24 RX ORDER — METOCLOPRAMIDE HYDROCHLORIDE 5 MG/ML
5 INJECTION INTRAMUSCULAR; INTRAVENOUS EVERY 6 HOURS PRN
Status: DISCONTINUED | OUTPATIENT
Start: 2020-07-24 | End: 2020-07-25 | Stop reason: HOSPADM

## 2020-07-24 RX ORDER — NEOSTIGMINE METHYLSULFATE 0.5 MG/ML
INJECTION, SOLUTION INTRAVENOUS
Status: DISCONTINUED | OUTPATIENT
Start: 2020-07-24 | End: 2020-07-24

## 2020-07-24 RX ADMIN — MIDAZOLAM 2 MG: 1 INJECTION INTRAMUSCULAR; INTRAVENOUS at 08:07

## 2020-07-24 RX ADMIN — FENTANYL CITRATE 50 MCG: 50 INJECTION INTRAMUSCULAR; INTRAVENOUS at 08:07

## 2020-07-24 RX ADMIN — SODIUM CHLORIDE, SODIUM GLUCONATE, SODIUM ACETATE, POTASSIUM CHLORIDE, MAGNESIUM CHLORIDE, SODIUM PHOSPHATE, DIBASIC, AND POTASSIUM PHOSPHATE: .53; .5; .37; .037; .03; .012; .00082 INJECTION, SOLUTION INTRAVENOUS at 10:07

## 2020-07-24 RX ADMIN — FENTANYL CITRATE 25 MCG: 50 INJECTION INTRAMUSCULAR; INTRAVENOUS at 04:07

## 2020-07-24 RX ADMIN — TRANEXAMIC ACID 1000 MG: 100 INJECTION, SOLUTION INTRAVENOUS at 10:07

## 2020-07-24 RX ADMIN — PROPOFOL 250 MG: 10 INJECTION, EMULSION INTRAVENOUS at 09:07

## 2020-07-24 RX ADMIN — Medication 10 MG: at 09:07

## 2020-07-24 RX ADMIN — Medication 10 MG: at 10:07

## 2020-07-24 RX ADMIN — ROCURONIUM BROMIDE 50 MG: 10 INJECTION, SOLUTION INTRAVENOUS at 09:07

## 2020-07-24 RX ADMIN — GLYCOPYRROLATE 200 MCG: 0.2 INJECTION, SOLUTION INTRAMUSCULAR; INTRAVENOUS at 11:07

## 2020-07-24 RX ADMIN — BUPIVACAINE HYDROCHLORIDE 40 ML: 2.5 INJECTION, SOLUTION EPIDURAL; INFILTRATION; INTRACAUDAL; PERINEURAL at 08:07

## 2020-07-24 RX ADMIN — ENOXAPARIN SODIUM 40 MG: 100 INJECTION SUBCUTANEOUS at 04:07

## 2020-07-24 RX ADMIN — CEFAZOLIN 3 G: 330 INJECTION, POWDER, FOR SOLUTION INTRAMUSCULAR; INTRAVENOUS at 10:07

## 2020-07-24 RX ADMIN — FENTANYL CITRATE 50 MCG: 50 INJECTION INTRAMUSCULAR; INTRAVENOUS at 10:07

## 2020-07-24 RX ADMIN — DEXAMETHASONE SODIUM PHOSPHATE 8 MG: 4 INJECTION, SOLUTION INTRAMUSCULAR; INTRAVENOUS at 09:07

## 2020-07-24 RX ADMIN — CLINDAMYCIN IN 5 PERCENT DEXTROSE 900 MG: 18 INJECTION, SOLUTION INTRAVENOUS at 04:07

## 2020-07-24 RX ADMIN — OXYCODONE HYDROCHLORIDE 10 MG: 10 TABLET ORAL at 10:07

## 2020-07-24 RX ADMIN — MIDAZOLAM 1 MG: 1 INJECTION INTRAMUSCULAR; INTRAVENOUS at 09:07

## 2020-07-24 RX ADMIN — NEOSTIGMINE METHYLSULFATE 5 MG: 0.5 INJECTION INTRAVENOUS at 03:07

## 2020-07-24 RX ADMIN — OXYCODONE HYDROCHLORIDE 5 MG: 5 TABLET ORAL at 04:07

## 2020-07-24 RX ADMIN — ACETAMINOPHEN 1000 MG: 10 INJECTION, SOLUTION INTRAVENOUS at 01:07

## 2020-07-24 RX ADMIN — FENTANYL CITRATE 25 MCG: 50 INJECTION INTRAMUSCULAR; INTRAVENOUS at 03:07

## 2020-07-24 RX ADMIN — MIDAZOLAM 1 MG: 1 INJECTION INTRAMUSCULAR; INTRAVENOUS at 08:07

## 2020-07-24 RX ADMIN — CEFAZOLIN 2 G: 1 INJECTION, POWDER, FOR SOLUTION INTRAMUSCULAR; INTRAVENOUS at 10:07

## 2020-07-24 RX ADMIN — LIDOCAINE HYDROCHLORIDE 100 MG: 20 INJECTION, SOLUTION INTRAVENOUS at 09:07

## 2020-07-24 RX ADMIN — MUPIROCIN: 20 OINTMENT TOPICAL at 10:07

## 2020-07-24 RX ADMIN — FENTANYL CITRATE 50 MCG: 50 INJECTION INTRAMUSCULAR; INTRAVENOUS at 09:07

## 2020-07-24 RX ADMIN — MIDAZOLAM 1 MG: 1 INJECTION INTRAMUSCULAR; INTRAVENOUS at 10:07

## 2020-07-24 RX ADMIN — DEXMEDETOMIDINE HYDROCHLORIDE 16 MCG: 100 INJECTION, SOLUTION, CONCENTRATE INTRAVENOUS at 01:07

## 2020-07-24 RX ADMIN — CEFAZOLIN 2 G: 330 INJECTION, POWDER, FOR SOLUTION INTRAMUSCULAR; INTRAVENOUS at 02:07

## 2020-07-24 RX ADMIN — SODIUM CHLORIDE, SODIUM GLUCONATE, SODIUM ACETATE, POTASSIUM CHLORIDE, MAGNESIUM CHLORIDE, SODIUM PHOSPHATE, DIBASIC, AND POTASSIUM PHOSPHATE: .53; .5; .37; .037; .03; .012; .00082 INJECTION, SOLUTION INTRAVENOUS at 12:07

## 2020-07-24 RX ADMIN — Medication 10 MG: at 11:07

## 2020-07-24 RX ADMIN — SODIUM CHLORIDE: 0.9 INJECTION, SOLUTION INTRAVENOUS at 09:07

## 2020-07-24 RX ADMIN — ONDANSETRON 4 MG: 2 INJECTION, SOLUTION INTRAMUSCULAR; INTRAVENOUS at 02:07

## 2020-07-24 RX ADMIN — EPHEDRINE SULFATE 10 MG: 50 INJECTION INTRAVENOUS at 02:07

## 2020-07-24 RX ADMIN — GLYCOPYRROLATE 200 MCG: 0.2 INJECTION, SOLUTION INTRAMUSCULAR; INTRAVENOUS at 10:07

## 2020-07-24 RX ADMIN — DEXMEDETOMIDINE HYDROCHLORIDE 16 MCG: 100 INJECTION, SOLUTION, CONCENTRATE INTRAVENOUS at 11:07

## 2020-07-24 RX ADMIN — SODIUM CHLORIDE, SODIUM GLUCONATE, SODIUM ACETATE, POTASSIUM CHLORIDE, MAGNESIUM CHLORIDE, SODIUM PHOSPHATE, DIBASIC, AND POTASSIUM PHOSPHATE: .53; .5; .37; .037; .03; .012; .00082 INJECTION, SOLUTION INTRAVENOUS at 02:07

## 2020-07-24 RX ADMIN — GLYCOPYRROLATE 0.6 MCG: 0.2 INJECTION, SOLUTION INTRAMUSCULAR; INTRAVENOUS at 03:07

## 2020-07-24 NOTE — BRIEF OP NOTE
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

## 2020-07-24 NOTE — ANESTHESIA PREPROCEDURE EVALUATION
07/24/2020  Tito Stanley is a 49 y.o., male.  Patient Active Problem List   Diagnosis    Neoplasm of uncertain behavior of connective and other soft tissue    Sarcoma of thigh, left     Past Surgical History:   Procedure Laterality Date    HERNIA REPAIR           Anesthesia Evaluation    I have reviewed the Patient Summary Reports.    I have reviewed the Nursing Notes. I have reviewed the NPO Status.      Review of Systems      Physical Exam  General:  Well nourished    Airway/Jaw/Neck:  Airway Findings: Mouth Opening: Normal General Airway Assessment: Adult  Mallampati: II  Improves to II with phonation.  Jaw/Neck Findings:  Limited Ability to Prognath  Neck ROM: Normal ROM     Eyes/Ears/Nose:  Eyes/Ears/Nose Findings:    Dental:  Dental Findings: In tact   Chest/Lungs:  Chest/Lungs Findings: Clear to auscultation, Normal Respiratory Rate     Heart/Vascular:  Heart Findings: Rate: Normal  Rhythm: Regular Rhythm  Sounds: Normal     Abdomen:  Abdomen Findings:  Normal     Musculoskeletal:  Musculoskeletal Findings:    Skin:  Skin Findings:     Mental Status:  Mental Status Findings:  Cooperative, Alert and Oriented         Anesthesia Plan  Type of Anesthesia, risks & benefits discussed:  Anesthesia Type:  general, MAC, regional  Patient's Preference:   Intra-op Monitoring Plan:   Intra-op Monitoring Plan Comments:   Post Op Pain Control Plan:   Post Op Pain Control Plan Comments:   Induction:   IV  Beta Blocker:  Patient is not currently on a Beta-Blocker (No further documentation required).       Informed Consent: Patient understands risks and agrees with Anesthesia plan.  Questions answered. Anesthesia consent signed with patient.  ASA Score: 2     Day of Surgery Review of History & Physical:    H&P update referred to the surgeon.         Ready For Surgery From Anesthesia Perspective.

## 2020-07-24 NOTE — ANESTHESIA PROCEDURE NOTES
Intubation  Performed by: Erik Panchal CRNA  Authorized by: Shivani Orta MD     Intubation:     Induction:  Intravenous    Intubated:  Postinduction    Mask Ventilation:  Moderately difficult with oral airway    Attempts:  1    Attempted By:  CRNA    Method of Intubation:  Direct    Blade:  Jamie 3    Laryngeal View Grade: Grade I - full view of chords      Difficult Airway Encountered?: No      Complications:  None    Airway Device Size:  7.5    Style/Cuff Inflation:  Cuffed    Inflation Amount (mL):  8    Tube secured:  23    Secured at:  The lips    Placement Verified By:  Capnometry    Complicating Factors:  None    Findings Post-Intubation:  BS equal bilateral

## 2020-07-24 NOTE — ANESTHESIA PROCEDURE NOTES
Femoral Single Injection    Patient location during procedure: pre-op   Block not for primary anesthetic.  Reason for block: at surgeon's request and post-op pain management   Post-op Pain Location: Left Leg Pain  Start time: 7/24/2020 8:40 AM  Timeout: 7/24/2020 8:35 AM   End time: 7/24/2020 9:05 AM    Staffing  Authorizing Provider: Ayse Chapman MD  Performing Provider: Paulo Lynn MD    Preanesthetic Checklist  Completed: patient identified, site marked, surgical consent, pre-op evaluation, timeout performed, IV checked, risks and benefits discussed and monitors and equipment checked  Peripheral Block  Patient position: supine  Prep: ChloraPrep and site prepped and draped  Patient monitoring: heart rate, cardiac monitor, continuous pulse ox, continuous capnometry and frequent blood pressure checks  Block type: femoral  Laterality: left  Injection technique: single shot  Needle  Needle type: Stimuplex   Needle gauge: 17 G  Needle length: 3.5 in  Needle localization: anatomical landmarks and ultrasound guidance  Catheter type: spring wound  Catheter size: 19 G  Test dose: lidocaine 1.5% with Epi 1-to-200,000 and negative   -ultrasound image captured on disc.  Assessment  Injection assessment: negative aspiration, negative parasthesia and local visualized surrounding nerve  Paresthesia pain: none  Heart rate change: no  Slow fractionated injection: yes  Additional Notes  VSS.  [facility] RN monitoring vitals throughout procedure.  Patient tolerated procedure well.

## 2020-07-24 NOTE — ANESTHESIA POSTPROCEDURE EVALUATION
Anesthesia Post Evaluation    Patient: Tito Stanley    Procedure(s) Performed: Procedure(s) (LRB):  EXCISION, SARCOMA, LOWER EXTREMITY (Left)    Final Anesthesia Type: general    Patient location during evaluation: PACU  Patient participation: Yes- Able to Participate  Level of consciousness: awake and alert  Post-procedure vital signs: reviewed and stable  Pain management: adequate  Airway patency: patent    PONV status at discharge: No PONV  Anesthetic complications: no      Cardiovascular status: stable  Respiratory status: spontaneous ventilation and room air  Hydration status: euvolemic  Follow-up not needed.          Vitals Value Taken Time   /67 07/24/20 1547   Temp 36.2 °C (97.2 °F) 07/24/20 1532   Pulse 76 07/24/20 1553   Resp 19 07/24/20 1553   SpO2 100 % 07/24/20 1553   Vitals shown include unvalidated device data.      No case tracking events are documented in the log.      Pain/Bruce Score: Pain Rating Prior to Med Admin: 0 (7/24/2020  8:58 AM)  Pain Rating Post Med Admin: 0 (7/24/2020  8:38 AM)  Bruce Score: 10 (7/24/2020  9:15 AM)

## 2020-07-24 NOTE — PLAN OF CARE
Alert and oriented.Denies pain and any other complaint. Tolerated water and ice chips well. To room 526. C/o numbness to left leg, but able to feel light touch to left foot and has strong left foot strength.

## 2020-07-24 NOTE — ANESTHESIA PROCEDURE NOTES
Sciatic Nerve Single Injection Block    Patient location during procedure: pre-op   Block not for primary anesthetic.  Reason for block: at surgeon's request and post-op pain management   Post-op Pain Location: Left Leg Pain  Start time: 7/24/2020 8:35 AM  Timeout: 7/24/2020 8:30 AM   End time: 7/24/2020 9:10 AM    Staffing  Authorizing Provider: Ayse Chapman MD  Performing Provider: Paulo Lynn MD    Preanesthetic Checklist  Completed: patient identified, site marked, surgical consent, pre-op evaluation, timeout performed, IV checked, risks and benefits discussed and monitors and equipment checked  Peripheral Block  Prep: ChloraPrep  Patient monitoring: heart rate, cardiac monitor, continuous pulse ox, continuous capnometry and frequent blood pressure checks  Block type: sciatic  Laterality: left  Injection technique: single shot  Needle  Needle type: Stimuplex   Needle gauge: 21 G  Needle length: 4 in  Needle localization: nerve stimulator and anatomical landmarks     Assessment  Injection assessment: negative aspiration and negative parasthesia  Paresthesia pain: none  Heart rate change: no  Slow fractionated injection: yes

## 2020-07-24 NOTE — INTERVAL H&P NOTE
The patient has been examined and the H&P has been reviewed:    I concur with the findings and no changes have occurred since H&P was written.    Anesthesia/Surgery risks, benefits and alternative options discussed and understood by patient/family.          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

## 2020-07-25 VITALS
HEART RATE: 97 BPM | DIASTOLIC BLOOD PRESSURE: 75 MMHG | TEMPERATURE: 97 F | HEIGHT: 71 IN | RESPIRATION RATE: 14 BRPM | WEIGHT: 305 LBS | BODY MASS INDEX: 42.7 KG/M2 | SYSTOLIC BLOOD PRESSURE: 130 MMHG | OXYGEN SATURATION: 95 %

## 2020-07-25 LAB
ANION GAP SERPL CALC-SCNC: 7 MMOL/L (ref 8–16)
BASOPHILS # BLD AUTO: 0.02 K/UL (ref 0–0.2)
BASOPHILS NFR BLD: 0.2 % (ref 0–1.9)
BUN SERPL-MCNC: 14 MG/DL (ref 6–20)
CALCIUM SERPL-MCNC: 8.1 MG/DL (ref 8.7–10.5)
CHLORIDE SERPL-SCNC: 103 MMOL/L (ref 95–110)
CO2 SERPL-SCNC: 25 MMOL/L (ref 23–29)
CREAT SERPL-MCNC: 1 MG/DL (ref 0.5–1.4)
DIFFERENTIAL METHOD: ABNORMAL
EOSINOPHIL # BLD AUTO: 0 K/UL (ref 0–0.5)
EOSINOPHIL NFR BLD: 0.2 % (ref 0–8)
ERYTHROCYTE [DISTWIDTH] IN BLOOD BY AUTOMATED COUNT: 18.1 % (ref 11.5–14.5)
EST. GFR  (AFRICAN AMERICAN): >60 ML/MIN/1.73 M^2
EST. GFR  (NON AFRICAN AMERICAN): >60 ML/MIN/1.73 M^2
GLUCOSE SERPL-MCNC: 107 MG/DL (ref 70–110)
HCT VFR BLD AUTO: 26.9 % (ref 40–54)
HGB BLD-MCNC: 7.9 G/DL (ref 14–18)
IMM GRANULOCYTES # BLD AUTO: 0.03 K/UL (ref 0–0.04)
IMM GRANULOCYTES NFR BLD AUTO: 0.2 % (ref 0–0.5)
LYMPHOCYTES # BLD AUTO: 2.8 K/UL (ref 1–4.8)
LYMPHOCYTES NFR BLD: 22.6 % (ref 18–48)
MCH RBC QN AUTO: 25.2 PG (ref 27–31)
MCHC RBC AUTO-ENTMCNC: 29.4 G/DL (ref 32–36)
MCV RBC AUTO: 86 FL (ref 82–98)
MONOCYTES # BLD AUTO: 1.4 K/UL (ref 0.3–1)
MONOCYTES NFR BLD: 11.5 % (ref 4–15)
NEUTROPHILS # BLD AUTO: 8 K/UL (ref 1.8–7.7)
NEUTROPHILS NFR BLD: 65.3 % (ref 38–73)
NRBC BLD-RTO: 0 /100 WBC
PLATELET # BLD AUTO: 502 K/UL (ref 150–350)
PMV BLD AUTO: 8.7 FL (ref 9.2–12.9)
POCT GLUCOSE: 124 MG/DL (ref 70–110)
POTASSIUM SERPL-SCNC: 3.9 MMOL/L (ref 3.5–5.1)
RBC # BLD AUTO: 3.13 M/UL (ref 4.6–6.2)
SODIUM SERPL-SCNC: 135 MMOL/L (ref 136–145)
WBC # BLD AUTO: 12.28 K/UL (ref 3.9–12.7)

## 2020-07-25 PROCEDURE — 97165 OT EVAL LOW COMPLEX 30 MIN: CPT

## 2020-07-25 PROCEDURE — 25000003 PHARM REV CODE 250: Performed by: STUDENT IN AN ORGANIZED HEALTH CARE EDUCATION/TRAINING PROGRAM

## 2020-07-25 PROCEDURE — 97161 PT EVAL LOW COMPLEX 20 MIN: CPT

## 2020-07-25 PROCEDURE — 27364 RESECT THIGH/KNEE TUM 5 CM/>: CPT | Mod: 22,LT,, | Performed by: ORTHOPAEDIC SURGERY

## 2020-07-25 PROCEDURE — 63600175 PHARM REV CODE 636 W HCPCS: Performed by: STUDENT IN AN ORGANIZED HEALTH CARE EDUCATION/TRAINING PROGRAM

## 2020-07-25 PROCEDURE — 13122 CMPLX RPR S/A/L ADDL 5 CM/>: CPT | Mod: 59,,, | Performed by: ORTHOPAEDIC SURGERY

## 2020-07-25 PROCEDURE — 80048 BASIC METABOLIC PNL TOTAL CA: CPT

## 2020-07-25 PROCEDURE — 85025 COMPLETE CBC W/AUTO DIFF WBC: CPT

## 2020-07-25 PROCEDURE — 36415 COLL VENOUS BLD VENIPUNCTURE: CPT

## 2020-07-25 PROCEDURE — 13122 PR REP,SKIN,SCALP/EXTREM+5 CM/<: ICD-10-PCS | Mod: 59,,, | Performed by: ORTHOPAEDIC SURGERY

## 2020-07-25 PROCEDURE — 13121 PR RECMPL WND SCALP,EXTR 2.6-7.5 CM: ICD-10-PCS | Mod: 59,,, | Performed by: ORTHOPAEDIC SURGERY

## 2020-07-25 PROCEDURE — 13121 CMPLX RPR S/A/L 2.6-7.5 CM: CPT | Mod: 59,,, | Performed by: ORTHOPAEDIC SURGERY

## 2020-07-25 PROCEDURE — 27364 PR RAD RESECTION TUMOR SOFT TISSUE THIGH/KNEE 5+CM: ICD-10-PCS | Mod: 22,LT,, | Performed by: ORTHOPAEDIC SURGERY

## 2020-07-25 RX ORDER — OXYCODONE HYDROCHLORIDE 5 MG/1
5 TABLET ORAL EVERY 6 HOURS PRN
Qty: 42 TABLET | Refills: 0 | Status: SHIPPED | OUTPATIENT
Start: 2020-07-25

## 2020-07-25 RX ORDER — DEXTROMETHORPHAN HYDROBROMIDE, GUAIFENESIN 5; 100 MG/5ML; MG/5ML
650 LIQUID ORAL EVERY 8 HOURS
Qty: 120 TABLET | Refills: 0 | Status: SHIPPED | OUTPATIENT
Start: 2020-07-25

## 2020-07-25 RX ADMIN — AMLODIPINE BESYLATE 5 MG: 5 TABLET ORAL at 09:07

## 2020-07-25 RX ADMIN — BUMETANIDE 1 MG: 1 TABLET ORAL at 09:07

## 2020-07-25 RX ADMIN — ENOXAPARIN SODIUM 40 MG: 100 INJECTION SUBCUTANEOUS at 03:07

## 2020-07-25 RX ADMIN — OXYCODONE HYDROCHLORIDE 10 MG: 10 TABLET ORAL at 03:07

## 2020-07-25 RX ADMIN — CLINDAMYCIN IN 5 PERCENT DEXTROSE 900 MG: 18 INJECTION, SOLUTION INTRAVENOUS at 09:07

## 2020-07-25 RX ADMIN — CLINDAMYCIN IN 5 PERCENT DEXTROSE 900 MG: 18 INJECTION, SOLUTION INTRAVENOUS at 03:07

## 2020-07-25 RX ADMIN — CEFAZOLIN 2 G: 1 INJECTION, POWDER, FOR SOLUTION INTRAMUSCULAR; INTRAVENOUS at 05:07

## 2020-07-25 RX ADMIN — OXYCODONE HYDROCHLORIDE 10 MG: 10 TABLET ORAL at 02:07

## 2020-07-25 RX ADMIN — MUPIROCIN: 20 OINTMENT TOPICAL at 09:07

## 2020-07-25 RX ADMIN — LOSARTAN POTASSIUM 100 MG: 25 TABLET ORAL at 09:07

## 2020-07-25 RX ADMIN — OXYCODONE HYDROCHLORIDE 10 MG: 10 TABLET ORAL at 11:07

## 2020-07-25 RX ADMIN — CLINDAMYCIN IN 5 PERCENT DEXTROSE 900 MG: 18 INJECTION, SOLUTION INTRAVENOUS at 12:07

## 2020-07-25 RX ADMIN — OXYCODONE HYDROCHLORIDE 10 MG: 10 TABLET ORAL at 06:07

## 2020-07-25 NOTE — PROGRESS NOTES
Pt A & O x4, VS as charted, dressing on hip CDI and MITA drains collecting output as charted, up in chair with PT, fall precautions in place, pain monitored and controlled with scheduled and PRN medication, instructed to call staff for mobility, drain teaching provided, patient awaiting discharge orders pending eval by PT.

## 2020-07-25 NOTE — PT/OT/SLP EVAL
"Occupational Therapy   Evaluation and Discharge Note    Name: Tito Stanley  MRN: [id number]  Admitting Diagnosis:  Sarcoma of thigh, left 1 Day Post-Op    Recommendations:     Discharge Recommendations: home  Discharge Equipment Recommendations:  none  Barriers to discharge:  None    Assessment:     Tito Stanley is a 49 y.o. male with a medical diagnosis of Sarcoma of thigh, left. At this time, patient is functioning at their prior level of function and does not require further acute OT services. Patient completed all ADL's, bed mobility, and functional transfers with modified independence without difficulty.    Plan:     During this hospitalization, patient does not require further acute OT services.  Please re-consult if situation changes.    · Plan of Care Reviewed with: patient    Subjective     Chief Complaint: "I was starting to feel stiff in that bed."  Patient/Family Comments/goals: Return home and to PLOF    Occupational Profile:  Living Environment: Pt lives with his fiance in duplex single level home, TTE, t/s combo.  Previous level of function: Independent  Equipment Used at home:  none  Assistance upon Discharge: Patient's fiance is available to provide assist     Pain/Comfort:  · Pain Rating 1: 2/10  · Location - Side 1: Left  · Location - Orientation 1: generalized  · Location 1: thigh    Patients cultural, spiritual, Adventism conflicts given the current situation: no    Objective:     Communicated with: Nsg prior to session.  Patient found supine with telemetry, SCD, wound vac upon OT entry to room.    General Precautions: Standard, fall   Orthopedic Precautions:LLE weight bearing as tolerated   Braces: N/A     Occupational Performance:    Bed Mobility:    · Patient completed Rolling/Turning to Right with modified independence  · Patient completed Scooting/Bridging with modified independence  · Patient completed Supine to Sit with modified independence    Functional Mobility/Transfers:  · Patient " completed Sit <> Stand Transfer with modified independence  with  no assistive device   · Patient completed Bed <> Chair Transfer using Step Transfer technique with modified independence with no assistive device  · Functional Mobility: Modified independence, no assistive device within room, therapist managing lines, patient with no difficulty, no LOB/SOB    Activities of Daily Living:  · Grooming: modified independence    · Upper Body Dressing: modified independence    · Lower Body Dressing: modified independence      Cognitive/Visual Perceptual:  Cognitive/Psychosocial Skills:     -       Oriented to: Person, Place, Time and Situation   -       Follows Commands/attention:Follows multistep  commands  -       Safety awareness/insight to disability: intact   -       Mood/Affect/Coping skills/emotional control: Appropriate to situation    Physical Exam:  Postural examination/scapula alignment:    -       No postural abnormalities identified  Upper Extremity Range of Motion:     -       Right Upper Extremity: WFL  -       Left Upper Extremity: WFL  Upper Extremity Strength:    -       Right Upper Extremity: WFL  -       Left Upper Extremity: WFL   Strength:    -       Right Upper Extremity: WFL  -       Left Upper Extremity: WFL    AMPAC 6 Click ADL:  AMPAC Total Score: 24    Treatment & Education:  Evaluation completed, patient determined to be at baseline for completion of ADL's and functional transfers. Patient D/C acute OT.    Patient does not require continued skilled OT services at this time.  Patient educated on importance of continued edge of bed and out of bed activity and safety.   Patient educated to request re-ordering of therapy from MD if functional status/mobility should change during this admission.  Education:    Patient left up in chair with all lines intact and call button in reach    GOALS:   Multidisciplinary Problems     Occupational Therapy Goals     Not on file          Multidisciplinary  Problems (Resolved)        Problem: Occupational Therapy Goal    Goal Priority Disciplines Outcome Interventions   Occupational Therapy Goal   (Resolved)     OT, PT/OT Met    Description: No POC established as patient met all goals during completion of session. No acute OT services needed.                     History:     Past Medical History:   Diagnosis Date    Hypertension        Past Surgical History:   Procedure Laterality Date    HERNIA REPAIR         Time Tracking:     OT Date of Treatment: 07/25/20  OT Start Time: 0931  OT Stop Time: 0941  OT Total Time (min): 10 min    Billable Minutes:Evaluation 10 minutes    Subha Unger OT  7/25/2020

## 2020-07-25 NOTE — DISCHARGE SUMMARY
[facility]  Orthopedics  Discharge Summary      Patient Name: Tito Stanley  MRN: [id number]  Admission Date: 7/24/2020  Hospital Length of Stay: 1 days  Discharge Date and Time: 07/25/2020  Attending Physician: Yusef Lang MD   Discharging Provider: Calvin Mckinnon MD  Primary Care Provider: Primary Doctor No    HPI:   Patient is a 49 y.o. male here today to be evaluated for left posterior medial thigh mass.  States that this 1st noticed approximately 6-8 months ago.  He was at work and his coworkers notice that his left leg with more swollen than his right.  Since then he notes that the mass has been slowly growing however has not been causing any pain.  Denies distal numbness, tingling.  Denies paresthesias in the sciatic nerve distribution, states that he has had mild paresthesias anteriorly over the lateral femoral cutaneous nerve distribution.  Denies fevers, chills, malaise, body aches, weight loss, change in appetite, bowel or bladder changes.  States that he does not have any known family history of sarcoma however he will have to discuss this with his family.  He currently works for the [employer].  No other history of personal cancers.  History of hypertension however otherwise no major medical conditions.  He is a nonsmoker, does drink occasional alcohol.     Interval History 7/10/20: Path showed -Mostly acellular necrotic tissue with focal myxoid stroma and poorly preserved atypical spindle cells, suspicious for, but not diagnostic of a soft tissue neoplasm (see comment). In a radiographic setting which is suspicious for a lipocytic neoplasm, these microscopic findings could represent a necrotic area of a myxoid or well-differentiated liposarcoma, but no definitive viable morphology is present.  He denies any interval change to his thigh mass.  Denies any B symptoms.       Procedure(s) (LRB):  EXCISION, SARCOMA, LOWER EXTREMITY (Left)      Hospital Course:  On 7/24/20, the  patient arrived to the [facility] for proper pre-operative management.  Upon completion of pre-operative preparation, the patient was taken back to the operative theatre. Left thigh soft tissue sarcoma excision was performed without complication and the patient was transported to the post anesthesia care unit in stable condition.  After appropriate recovery from the anaesthetic agents used during the surgery, the patient was then transported to the hospital inpatient floor.  The interim of the hospital stay from arrival on the floor up to discharge has been uncomplicated. The patient has tolerated regular diet.  The patient's pain has been controlled using a multimodal approach. Currently, the patient's pain is well controlled on an oral regimen.  The patient has been voiding without difficulty.  The patient began participation in physical therapy after surgery and has progressed throughout the entire hospital stay.  Currently, the patient's progress is sufficient to allow the them to be discharged to home safely.  The patient agrees with this assessment and desires a discharge today.      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] 07/10/2020 Unknown      Problems Resolved During this Admission:      Discharged Condition: good    Disposition: Home or Self Care    Follow Up:    Patient Instructions:      Diet general     Call MD for:  temperature >100.4     Call MD for:  persistent nausea and vomiting     Call MD for:  severe uncontrolled pain     Call MD for:  difficulty breathing, headache or visual disturbances     Call MD for:  redness, tenderness, or signs of infection (pain, swelling, redness, odor or  green/yellow discharge around incision site)     Call MD for:  hives     Call MD for:  persistent dizziness or light-headedness     Call MD for:  extreme fatigue     Sponge bath only until clinic visit     Keep surgical extremity elevated     Ice to affected area     No driving, operating heavy equipment or signing legal documents while taking pain medication     Leave dressing on - Keep it clean, dry, and intact until clinic visit     Wound care routine (specify)   Order Comments: RECORD OUTPUT OF DRAINS TWICE A DAY     Weight bearing as tolerated     Activity as tolerated     Medications:  Reconciled Home Medications:      Medication List      START taking these medications    acetaminophen 650 MG Tbsr  Commonly known as: TYLENOL  Take 1 tablet (650 mg total) by mouth every 8 (eight) hours.     oxyCODONE 5 MG immediate release tablet  Commonly known as: ROXICODONE  Take 1 tablet (5 mg total) by mouth every 6 (six) hours as needed for Pain. May take 1-2 tablets every 6 hours as needed for pain        CONTINUE taking these medications    amLODIPine 5 MG tablet  Commonly known as: NORVASC  Take 5 mg by mouth.     bumetanide 1 MG tablet  Commonly known as: BUMEX  TAKE 1 TABLET BY MOUTH DAILY. THIS REPLACE THE HCTZ PART OF VALSARTAN MEDS     clotrimazole-betamethasone 1-0.05% cream  Commonly known as: LOTRISONE  Apply topically.     ibuprofen 800 MG tablet  Commonly known as: ADVIL,MOTRIN     losartan 100 MG tablet  Commonly known as: COZAAR  Take 100 mg by mouth.     meloxicam 15 MG tablet  Commonly known as: MOBIC  Take 1 tablet (15 mg total) by mouth once daily.     metFORMIN 500 MG tablet  Commonly known as: GLUCOPHAGE  Take 500 mg by mouth 2 (two) times daily with meals.     sulfamethoxazole-trimethoprim 800-160mg 800-160 mg Tab  Commonly known as: BACTRIM DS  Take 1 tablet by mouth once daily.        STOP taking these medications    HYDROcodone-acetaminophen 5-325 mg per tablet  Commonly known as: NORCO      naproxen sodium 220 MG tablet  Commonly known as: ANAPROX            Calvin Mckinnon MD  Orthopedics  [facility]

## 2020-07-25 NOTE — PLAN OF CARE
Evaluation completed, patient determined to be at baseline for completion of ADL's and functional transfers. Patient D/C acute OT.    Patient does not require continued skilled OT services at this time.  Patient educated on importance of continued edge of bed and out of bed activity and safety.   Patient educated to request re-ordering of therapy from MD if functional status/mobility should change during this admission.     Problem: Occupational Therapy Goal  Goal: Occupational Therapy Goal  Description: No POC established as patient met all goals during completion of session. No acute OT services needed.    Outcome: Met

## 2020-07-25 NOTE — PROGRESS NOTES
"[facility]  Orthopedics  Progress Note    Patient Name: Tito Stanley  MRN: [id number]  Admission Date: 7/24/2020  Hospital Length of Stay: 1 days  Attending Provider: Yusef Lang MD  Primary Care Provider: Primary Doctor No  Follow-up For: Procedure(s) (LRB):  EXCISION, SARCOMA, LOWER EXTREMITY (Left)    Post-Operative Day: 1 Day Post-Op  Subjective:     Principal Problem:Sarcoma of thigh, left    Principal Orthopedic Problem: same    Interval History: Patient examined at the bedside. NAEON. Pain controlled. Tolerating PO w/out N/V and voiding appropriately.   Hasn't worked with PT yet. Drain with 160ccs SS output since surgery    Review of patient's allergies indicates:  No Known Allergies    Current Facility-Administered Medications   Medication    acetaminophen tablet 650 mg    amLODIPine tablet 5 mg    bumetanide tablet 1 mg    clindamycin in D5W 900 mg/50 mL IVPB 900 mg    dextrose 50% injection 12.5 g    dextrose 50% injection 25 g    enoxaparin injection 40 mg    glucagon (human recombinant) injection 1 mg    glucose chewable tablet 16 g    glucose chewable tablet 24 g    insulin aspart U-100 pen 0-5 Units    losartan tablet 100 mg    metoclopramide HCl injection 5 mg    mupirocin 2 % ointment    ondansetron injection 4 mg    oxyCODONE immediate release tablet 10 mg    oxyCODONE immediate release tablet 5 mg    sodium chloride 0.9% flush 10 mL     Objective:     Vital Signs (Most Recent):  Temp: 97 °F (36.1 °C) (07/25/20 0503)  Pulse: 84 (07/25/20 0503)  Resp: 16 (07/25/20 0607)  BP: (!) 152/69 (07/25/20 0503)  SpO2: 95 % (07/25/20 0503) Vital Signs (24h Range):  Temp:  [97 °F (36.1 °C)-98.5 °F (36.9 °C)] 97 °F (36.1 °C)  Pulse:  [61-84] 84  Resp:  [14-20] 16  SpO2:  [95 %-100 %] 95 %  BP: (114-164)/(55-79) 152/69     Weight: (!) 138.3 kg (305 lb)  Height: 5' 11" (180.3 cm)  Body mass index is 42.54 kg/m².      Intake/Output Summary (Last 24 hours) at 7/25/2020 " 0636  Last data filed at 7/25/2020 0518  Gross per 24 hour   Intake 4350 ml   Output 1600 ml   Net 2750 ml       Ortho/SPM Exam   AAOx4  NAD  Reg rate  No increased WOB    LLE  Dressing c/d/i  Drains in place holding suction  Prevena holding suction  SILT T/SP/DP/Johnson/Sa  Motor intact T/SP/DP  WWP extremities  FCDs in place and functioning    Significant Labs:  CBC and BMP pending this AM    Significant Imaging: I have reviewed all pertinent imaging results/findings.    Assessment/Plan:     * Sarcoma of thigh, left  49 y.o. male POD1 s/p L thigh sarcoma removal     Pain control: multimodal per surgical home  PT/OT: WBAT LLE no restrictions  DVT PPx: Lovenox, FCDs at all times when not ambulating  Abx: postop Ancef completed. Clinda while drains in place.  Continue drains and prevena    Dispo: f/u PT reccs, DC johnson, Continue drains. Please teach drain care and provide patient with sheet to record outputs @8AM and 8PM upon discharge as will likely be discharged with drains in place.               Calvin Mckinnon MD  Orthopedics  [facility]   shortness of breath

## 2020-07-25 NOTE — SUBJECTIVE & OBJECTIVE
"Principal Problem:Sarcoma of thigh, left    Principal Orthopedic Problem: same    Interval History: Patient examined at the bedside. NAEON. Pain controlled. Tolerating PO w/out N/V and voiding appropriately.   Hasn't worked with PT yet. Drain with 160ccs SS output since surgery    Review of patient's allergies indicates:  No Known Allergies    Current Facility-Administered Medications   Medication    acetaminophen tablet 650 mg    amLODIPine tablet 5 mg    bumetanide tablet 1 mg    clindamycin in D5W 900 mg/50 mL IVPB 900 mg    dextrose 50% injection 12.5 g    dextrose 50% injection 25 g    enoxaparin injection 40 mg    glucagon (human recombinant) injection 1 mg    glucose chewable tablet 16 g    glucose chewable tablet 24 g    insulin aspart U-100 pen 0-5 Units    losartan tablet 100 mg    metoclopramide HCl injection 5 mg    mupirocin 2 % ointment    ondansetron injection 4 mg    oxyCODONE immediate release tablet 10 mg    oxyCODONE immediate release tablet 5 mg    sodium chloride 0.9% flush 10 mL     Objective:     Vital Signs (Most Recent):  Temp: 97 °F (36.1 °C) (07/25/20 0503)  Pulse: 84 (07/25/20 0503)  Resp: 16 (07/25/20 0607)  BP: (!) 152/69 (07/25/20 0503)  SpO2: 95 % (07/25/20 0503) Vital Signs (24h Range):  Temp:  [97 °F (36.1 °C)-98.5 °F (36.9 °C)] 97 °F (36.1 °C)  Pulse:  [61-84] 84  Resp:  [14-20] 16  SpO2:  [95 %-100 %] 95 %  BP: (114-164)/(55-79) 152/69     Weight: (!) 138.3 kg (305 lb)  Height: 5' 11" (180.3 cm)  Body mass index is 42.54 kg/m².      Intake/Output Summary (Last 24 hours) at 7/25/2020 0636  Last data filed at 7/25/2020 0518  Gross per 24 hour   Intake 4350 ml   Output 1600 ml   Net 2750 ml       Ortho/SPM Exam   AAOx4  NAD  Reg rate  No increased WOB    LLE  Dressing c/d/i  Drains in place holding suction  Prevena holding suction  SILT T/SP/DP/Johnson/Sa  Motor intact T/SP/DP  WWP extremities  FCDs in place and functioning    Significant Labs:  CBC and BMP pending this " AM    Significant Imaging: I have reviewed all pertinent imaging results/findings.

## 2020-07-25 NOTE — ASSESSMENT & PLAN NOTE
49 y.o. male POD1 s/p L thigh sarcoma removal     Pain control: multimodal per surgical home  PT/OT: WBAT LLE no restrictions  DVT PPx: Lovenox, FCDs at all times when not ambulating  Abx: postop Ancef completed. Clinda while drains in place.  Continue drains and prevena    Dispo: f/u PT recmagali, PAULINO johnson

## 2020-07-25 NOTE — HOSPITAL COURSE
On 7/24/20, the patient arrived to the [facility] for proper pre-operative management.  Upon completion of pre-operative preparation, the patient was taken back to the operative theatre. Left thigh soft tissue sarcoma excision was performed without complication and the patient was transported to the post anesthesia care unit in stable condition.  After appropriate recovery from the anaesthetic agents used during the surgery, the patient was then transported to the hospital inpatient floor.  The interim of the hospital stay from arrival on the floor up to discharge has been uncomplicated. The patient has tolerated regular diet.  The patient's pain has been controlled using a multimodal approach. Currently, the patient's pain is well controlled on an oral regimen.  The patient has been voiding without difficulty.  The patient began participation in physical therapy after surgery and has progressed throughout the entire hospital stay.  Currently, the patient's progress is sufficient to allow the them to be discharged to home safely.  The patient agrees with this assessment and desires a discharge today.

## 2020-07-25 NOTE — OP NOTE
OPERATIVE REPORT    NAME: Tito Stanley    DATE OF SERVICE: 7/24/2020    SERVICE: Orthopedic Oncology.    PREOPERATIVE DIAGNOSIS: Left inner thigh mass, presumed soft tissue sarcoma.    POSTOPERATIVE DIAGNOSIS: same    PROCEDURES:  1. Radical/wide resection of deep soft tissue mass over 25cm in length.  22-modifier  2. Complex wound closure  3. Incisional VAC application.    ATTENDING SURGEON: Yusef Lang MD    ASSISTANT SURGEON: Calvin Mckinnon MD orthopaedic resident    ANESTHESIA: General endotracheal.  Peripheral nerve blocks to left femoral, obturator, and sciatic nerves    IMPLANTS: none    SPECIMEN:  1. Permanent - Left thigh soft tissue mass.  2. Frozen sections including proximal, distal, anterior, posterior, and deep.  3. Frozen section, second deep margin consisting of adductor brandi tendon.    INDICATIONS FOR PROCEDURE:  The patient is a 50yo male with very large, enlarging, soft tissue tumor on his left thigh.  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  Core needle biopsy had cellular atypia and myxoid components, but was not pathology was unable to definitively diagnose malignancy.  The mass has continued to enlarge, and we are opting for a wide resection.  The risks, benefits, and alternatives to the procedure were discussed with the patient, in addition to the possible complications including but not limited to infection, neurovascular injury, hematoma and wound complications, pain, DVT, pulmonary embolus, disease recurrence or progression. Thereafter, operative consent was obtained and the patient elected to proceed.    PROCEDURE IN DETAIL:  The patient was met and identified in the peroperative area, the surgical site was marked. Anesthesia performed peripheral nerve blocks in the preoperative area.  The patient was brought to the operating theater and transferred to a  well-padded OR table in the supine position. General anesthesia was induced and prophylactic antibiotics given, Ancef 3g.  1gram of TXA was given prior to incision, and another gram given during wound closure.  The left hindquarter was prepped with Alcohol, allowed to dry, and Chloraprep. The operative limb was draped sterilely with split-sheets in the standard fashion, the periphery and groin were sealed with Ioban.  A time out was performed verifying the patient, surgical site, and procedure to be performed.    The surgical incision consisted of a large longitudinal skin ellipse along the inner thigh, to remove excess skin for wound closure due to the extreme size of the mass.  There were many engorged veins in the subcutaneous tissues that were individually ligated to prevent hemorrhage.  The posterior/medial thigh compartment fascia could be incised, as the tumor was within the adductor muscle compartment.  The mass was first freed posteriorly from the compartment fascia, encountering additional draining blood vessels and ligating.  It appeared to be within the gracilis and adductor brandi.  The distal pole of the tumor was freed, ligating the semitendinosis tendon just above the knee.  Anteriorly the mass was freed until the medial intermuscular septum was encountered.  The femoral/popliteal artery was palpated and identified underneath the sartorious.  The medial septum was incised.  Continuing to work over the anterior face of the tumor, portions of sartorius were taken off the tumor, and adductor longus and minimus were taken down intramuscularly from the tumor.  The saphenous vein was ligated with 2-0 silk.  Tendinous portion of the adductor hiatus was identified and appeared uninvolved, and the tendonous septum taken down from the tumor.  Proximal adductor muscle attachments were taken down, and the mass was freed.  It was inked with 6 colors for proximal, distal, superficial/medial, deep/lateral,  anterior, and posterior as indicated using a soft tissue/breast coloring schema. The size of the thigh soft tissue defect resulted in more excess skin than anticipated, even with the initial skin ellipse.  Additional crescent of skin was excised from the anterior flap, full thickness.  This was attached to the tumor specimen with silk ties in the correct orientation.    Frozen sections were taken as follows:  Proximally from adductor brandi/minmus muscles  Anteriorly from sartorius muscle  Distally from semitendinosis  Posteriorly from the compartment fascia  Medially/deep from the adductor brandi tendon.    All were read back as negative except the medial/deep margin had myxoid components.  The pathologist was unable to confirm if this represented malignancy.  The remaining adductor brandi tendoninous portion was excised from the deep margin, and sent as a second frozen section.  This also contained myxoid components, the pathologist could not comment on if this represented malignancy or chronic myxoid change within the tendon due to the longstanding mass, but no definitively malignant cells were seen.  The entire tendon had been excised deeply, with popliteal fat and exposed muscle remaining, this was well beyond the apparent reactive capsule of the mass, so it was decided not to send additional frozen sections.    The wound was irrigated with peroxide and then saline.  The wound closure was complex, requiring deep retention sutures were placed to close down the potential space, excision of excessive skin as previously described, placement of two 15-Macedonian flat kwesi drains exiting distally to the incision, and a careful layered closure over 20cm of deep fascia, dermal, and subcuticular sutures of 0 vicryl, 2-0 vicryl, and 3-0 nylon respectively.  A Prevena negative pressure dressing was placed given the tenuous location of the incision on the inner thigh, close to the groin and prone to chaffing/friction, and  preventing seroma formation to encourage healing of the very large anteriorly and posteriorly undermined skin flaps.  The large size of the mass, weighing well over 13 pounds (the intraoperative scale was maxed out, so actual tumor weight was not obtainable intraoperatively) and well over 25cm in length, requiring additional surgical time over 4 hours to ensure wide resection and meticulous wound closure, ligation of feeding vessels, and waiting for frozen section readback, warranting a 22-modifier.    ESTIMATED BLOOD LOSS: 350cc    COMPLICATIONS:  No untoward events    DISPOSITION: Stable to PACU, admit to orthopaedics.  Weight bearing as tolerated once nerve blocks wear off, johnson discontinue 8am on postop day 1.  Followup in clinic 2 weeks after discharge to review pathology and monitor drain output.  Lovenox until discharge.  PO antibiotics while drains are in place (keflex)

## 2020-07-25 NOTE — PT/OT/SLP EVAL
Physical Therapy Evaluation and Discharge Note    Patient Name:  Tito Stanley   MRN:  [id number]    Recommendations:     Discharge Recommendations:  home   Discharge Equipment Recommendations: none   Barriers to discharge: None    Assessment:     Tito Stanley is a 49 y.o. male admitted with a medical diagnosis of Sarcoma of thigh, left. .  At this time, patient is functioning at their prior level of function and does not require further acute PT services.     Recent Surgery: Procedure(s) (LRB):  EXCISION, SARCOMA, LOWER EXTREMITY (Left) 1 Day Post-Op    Plan:     During this hospitalization, patient does not require further acute PT services.  Please re-consult if situation changes.      Subjective     Chief Complaint: LLE pain  Patient/Family Comments/goals: to return home  Pain/Comfort:  · Pain Rating 1: (reports 2/10 LLE pain)    Patients cultural, spiritual, Jainism conflicts given the current situation: no    Living Environment:  Pt lives c fiance in [facility] c [place].  PTA driving, working and no falls  Prior to admission, patients level of function was independent.  Equipment used at home: none.  DME owned (not currently used): none.  Upon discharge, patient will have assistance from family.    Objective:     Communicated with RN and OT prior to session.  Patient found HOB elevated with telemetry, SCD, wound vac, MITA drain, peripheral IV upon PT entry to room.    General Precautions: Standard, fall   Orthopedic Precautions:LLE weight bearing as tolerated   Braces: N/A     Exams:  · Cognitive Exam:  Patient is oriented to Person, Place, Time and Situation  · RLE ROM: WFL  · RLE Strength: WFL  · LLE ROM: WFL  · LLE Strength: WFL    Functional Mobility:  · Bed Mobility:     · Rolling Left:  independence  · Scooting: independence  · Supine to Sit: independence  · Transfers:     · Sit to Stand:  independence with no AD  · Bed to Chair: supervision with  no AD  using  Step Transfer  · Gait: 40ft c no AD (S)  · Balance:  standing (S)    AM-PAC 6 CLICK MOBILITY  Total Score:22       Therapeutic Activities and Exercises:  Pt educated on: PT role/POC; safety c mobility; benefits of OOB activities; performing therex; d/c recs - v/u      AM-PAC 6 CLICK MOBILITY  Total Score:22     Patient left up in chair with all lines intact, call button in reach and RN notified.    GOALS:   Multidisciplinary Problems     Physical Therapy Goals     Not on file          Multidisciplinary Problems (Resolved)        Problem: Physical Therapy Goal    Goal Priority Disciplines Outcome Goal Variances Interventions   Physical Therapy Goal   (Resolved)     PT, PT/OT Met                     History:     Past Medical History:   Diagnosis Date    Hypertension        Past Surgical History:   Procedure Laterality Date    HERNIA REPAIR         Time Tracking:     PT Received On: 07/25/20  PT Start Time: 0931     PT Stop Time: 0940  PT Total Time (min): 9 min     Billable Minutes: Evaluation 9 min      Kristopher Valdez, PT  07/25/2020

## 2020-07-25 NOTE — PLAN OF CARE
Problem: Physical Therapy Goal  Goal: Physical Therapy Goal  Outcome: Met   Eval and D/C from acute PT services    Kristopher Valdez PT,DPT  7/25/2020

## 2020-07-25 NOTE — HPI
Patient is a 49 y.o. male here today to be evaluated for left posterior medial thigh mass.  States that this 1st noticed approximately 6-8 months ago.  He was at work and his coworkers notice that his left leg with more swollen than his right.  Since then he notes that the mass has been slowly growing however has not been causing any pain.  Denies distal numbness, tingling.  Denies paresthesias in the sciatic nerve distribution, states that he has had mild paresthesias anteriorly over the lateral femoral cutaneous nerve distribution.  Denies fevers, chills, malaise, body aches, weight loss, change in appetite, bowel or bladder changes.  States that he does not have any known family history of sarcoma however he will have to discuss this with his family.  He currently works for the [employer].  No other history of personal cancers.  History of hypertension however otherwise no major medical conditions.  He is a nonsmoker, does drink occasional alcohol.     Interval History 7/10/20: Path showed -Mostly acellular necrotic tissue with focal myxoid stroma and poorly preserved atypical spindle cells, suspicious for, but not diagnostic of a soft tissue neoplasm (see comment). In a radiographic setting which is suspicious for a lipocytic neoplasm, these microscopic findings could represent a necrotic area of a myxoid or well-differentiated liposarcoma, but no definitive viable morphology is present.  He denies any interval change to his thigh mass.  Denies any B symptoms.

## 2020-07-29 NOTE — PHYSICIAN QUERY
"PT Name: Tito Stanley  MR #: [id number]     Documentation Clarification      CDS/: Estephania AYON, RN             Contact information: [email or web address]    This form is a permanent document in the medical record.     Query Date: July 29, 2020    By submitting this query, we are merely seeking further clarification of documentation. Please utilize your independent clinical judgment when addressing the question(s) below.    The Medical Record reflects the following:    Supporting Clinical Findings Location in Medical Record      Weight: (!) 138.3 kg (305 lb)  Height: 5' 11" (180.3 cm)  Body mass index is 42.54 kg/m².      Dr. Mckinnon / Dr. Lang Orthopedic Surgery progress 7/25/20                             Provider, please provide diagnosis or diagnoses associated with above clinical findings.    [   ]   Obesity, unspecified   [  X ]  Morbid Obesity   [   ] Other (please specify): ____________   [  ] Clinically undetermined                                                                                                                       "

## 2020-07-31 ENCOUNTER — OFFICE VISIT (OUTPATIENT)
Dept: ORTHOPEDICS | Facility: CLINIC | Age: 50
End: 2020-07-31
Payer: COMMERCIAL

## 2020-07-31 VITALS
DIASTOLIC BLOOD PRESSURE: 77 MMHG | HEIGHT: 71 IN | TEMPERATURE: 99 F | WEIGHT: 314.38 LBS | SYSTOLIC BLOOD PRESSURE: 144 MMHG | HEART RATE: 74 BPM | BODY MASS INDEX: 44.01 KG/M2

## 2020-07-31 DIAGNOSIS — C49.22 SARCOMA OF THIGH, LEFT: Primary | ICD-10-CM

## 2020-07-31 PROCEDURE — 99999 PR PBB SHADOW E&M-EST. PATIENT-LVL III: ICD-10-PCS | Mod: PBBFAC,,, | Performed by: ORTHOPAEDIC SURGERY

## 2020-07-31 PROCEDURE — 99024 PR POST-OP FOLLOW-UP VISIT: ICD-10-PCS | Mod: S$GLB,,, | Performed by: ORTHOPAEDIC SURGERY

## 2020-07-31 PROCEDURE — 99024 POSTOP FOLLOW-UP VISIT: CPT | Mod: S$GLB,,, | Performed by: ORTHOPAEDIC SURGERY

## 2020-07-31 PROCEDURE — 99999 PR PBB SHADOW E&M-EST. PATIENT-LVL III: CPT | Mod: PBBFAC,,, | Performed by: ORTHOPAEDIC SURGERY

## 2020-07-31 NOTE — PROGRESS NOTES
Mr. Stanley is a 49-year-old male 1 week status post wide resection of left medial thigh mass.  He was discharged on postop day 1 with his Prevena wound VAC and 2 drains.  Endorses having less than 1 oz of drainage from his drains.  Is not taking any pain medication and is ambulating without pain.  Denies fevers/chills, chest pain, shortness of breath.  Tolerating p.o. and voiding appropriately.    Exam:   PE:  Gen:  No acute distress  CV:  Peripherally well-perfused.    Lungs:  Normal respiratory effort.  Head/Neck:  Normocephalic.  Atraumatic.     LLE:  Once prevena removed, well healing incision that is c/d/i  Both drains holding suction with SS output  No erythema or ecchymosis or signs of cellulitis  Mildly TTP over postoperative area that is expected  NVI    Pathology :LOW-GRADE LIPOSARCOMA, FAVOR WELL-DIFFERENTIATED   LIPOSARCOMA    A/P:  49-year-old male status post above doing well.  - discussed with the patient that his pathology showed a low-grade liposarcoma and radiation will not be indicated in this case  - counseled him that we also had negative margins which confirms that our treatment was adequate time  - also educated and that the ability for his cancer to metastasize is very unlikely and that continuing to monitor recurrence is the goal  - 1 of 2 drains and Prevena was removed and wound was recovered with dressings  - return to clinic in 1 week for possible suture and drain removal  - WBAT LLE  - Not okay for showering until stitches are removed  - Continue accurate outputs for last drain in place

## 2020-08-03 PROBLEM — D48.19 NEOPLASM OF UNCERTAIN BEHAVIOR OF CONNECTIVE AND OTHER SOFT TISSUE: Status: RESOLVED | Noted: 2020-06-05 | Resolved: 2020-08-03

## 2020-08-07 ENCOUNTER — OFFICE VISIT (OUTPATIENT)
Dept: ORTHOPEDICS | Facility: CLINIC | Age: 50
End: 2020-08-07
Payer: COMMERCIAL

## 2020-08-07 VITALS
HEART RATE: 74 BPM | TEMPERATURE: 98 F | WEIGHT: 312.38 LBS | BODY MASS INDEX: 43.73 KG/M2 | HEIGHT: 71 IN | SYSTOLIC BLOOD PRESSURE: 139 MMHG | DIASTOLIC BLOOD PRESSURE: 70 MMHG

## 2020-08-07 DIAGNOSIS — C49.22 SARCOMA OF THIGH, LEFT: Primary | ICD-10-CM

## 2020-08-07 PROCEDURE — 99999 PR PBB SHADOW E&M-EST. PATIENT-LVL III: CPT | Mod: PBBFAC,,, | Performed by: ORTHOPAEDIC SURGERY

## 2020-08-07 PROCEDURE — 99999 PR PBB SHADOW E&M-EST. PATIENT-LVL III: ICD-10-PCS | Mod: PBBFAC,,, | Performed by: ORTHOPAEDIC SURGERY

## 2020-08-07 PROCEDURE — 99024 PR POST-OP FOLLOW-UP VISIT: ICD-10-PCS | Mod: S$GLB,,, | Performed by: ORTHOPAEDIC SURGERY

## 2020-08-07 PROCEDURE — 99024 POSTOP FOLLOW-UP VISIT: CPT | Mod: S$GLB,,, | Performed by: ORTHOPAEDIC SURGERY

## 2020-08-07 NOTE — PROGRESS NOTES
Subjective:     HPI:   Tito Stanley is a 49 y.o. male who presents 2 weeks postop from left inner thigh well differentiated liposarcoma resection.  DOS 7/24/20  Second postop visit for wound and drain check.  Incisional vac removed and one drain removed 1 week postop.  Remaining drain has produced scant output.  No pain issues.  Keeping incision covered with an ACE wrap.  Some mild numbness anteriormedial thigh, otherwise no issues.  Ambulating without assistance.  Not taking any pain medication.    Denies F/C/NS, no SOB/CP.  No falls.     Objective:   Exam:  Left inner thigh incision C/D/I, midportion has some superficial dehiscance that doesn't probe deeply, deep dermal layer intact, 3 inches in length.  Incision extends from inner groin to the adductor tubercle region of the femur.  Nylon sutures intact and becoming buried.  No erythema.  SILT LFC nerve and femoral nerve, some mild decreased sensation along inner thigh.  Full knee ROM.    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  Well differentiated liposarcoma  T4, N0, M0, G1,m Tumor Stage IB, low grade and large size.     Plan:       Drain removed.  Nylon sutures removed.  Steristrips and mastosol used to cover midportion of incision.    Okay to start Physical Therapy.  Okay to shower, but keep incision dry otherwise.    Followup in 6 weeks.    Surveillance scans at 3 months postop.  Then 6 months thereafter.    Orders Placed This Encounter   Procedures    Ambulatory referral/consult to Physical/Occupational Therapy     Standing Status:   Future     Standing Expiration Date:   9/7/2021     Referral Priority:   Routine     Referral Type:   Physical Medicine     Referral Reason:   Specialty Services Required     Number of Visits Requested:   1       Past Medical History:   Diagnosis Date    Hypertension        Past Surgical History:   Procedure Laterality Date     HERNIA REPAIR      SURGICAL REMOVAL OF SARCOMA OF LOWER EXTREMITY Left 7/24/2020    Procedure: EXCISION, SARCOMA, LOWER EXTREMITY;  Surgeon: Yusef Lang MD;  Location: [facility] OR [address] [place] [address];  Service: Orthopedics;  Laterality: Left;  radical resection left thigh sarcoma       No family history on file.    Social History     Socioeconomic History    Marital status: Single     Spouse name: Not on file    Number of children: Not on file    Years of education: Not on file    Highest education level: Not on file   Occupational History    Not on file   Social Needs    Financial resource strain: Not on file    Food insecurity     Worry: Not on file     Inability: Not on file    Transportation needs     Medical: Not on file     Non-medical: Not on file   Tobacco Use    Smoking status: Never Smoker    Smokeless tobacco: Never Used   Substance and Sexual Activity    Alcohol use: Yes     Comment: 1-2 week    Drug use: No    Sexual activity: Not on file   Lifestyle    Physical activity     Days per week: Not on file     Minutes per session: Not on file    Stress: Not on file   Relationships    Social connections     Talks on phone: Not on file     Gets together: Not on file     Attends Judaism service: Not on file     Active member of club or organization: Not on file     Attends meetings of clubs or organizations: Not on file     Relationship status: Not on file   Other Topics Concern    Not on file   Social History Narrative    Not on file

## 2020-08-10 ENCOUNTER — CLINICAL SUPPORT (OUTPATIENT)
Dept: REHABILITATION | Facility: OTHER | Age: 50
End: 2020-08-10
Payer: COMMERCIAL

## 2020-08-10 DIAGNOSIS — R29.898 WEAKNESS OF BOTH LOWER EXTREMITIES: ICD-10-CM

## 2020-08-10 DIAGNOSIS — C49.22 SARCOMA OF THIGH, LEFT: ICD-10-CM

## 2020-08-10 DIAGNOSIS — R68.89 DECREASED FUNCTIONAL ACTIVITY TOLERANCE: ICD-10-CM

## 2020-08-10 DIAGNOSIS — Z74.09 IMPAIRED FUNCTIONAL MOBILITY, BALANCE, GAIT, AND ENDURANCE: ICD-10-CM

## 2020-08-10 DIAGNOSIS — M25.662 DECREASED RANGE OF MOTION (ROM) OF LEFT KNEE: ICD-10-CM

## 2020-08-10 PROCEDURE — 97161 PT EVAL LOW COMPLEX 20 MIN: CPT | Mod: PN

## 2020-08-10 PROCEDURE — 97110 THERAPEUTIC EXERCISES: CPT | Mod: PN

## 2020-08-11 NOTE — PLAN OF CARE
"[facility]  Initial Evaluation-Lower Extremity    Date: 8/10/2020   Name: Tito Stanley  Clinic Number: [id number]    Therapy Diagnosis:   Encounter Diagnoses   Name Primary?    Sarcoma of thigh, left     Impaired functional mobility, balance, gait, and endurance     Decreased functional activity tolerance     Weakness of both lower extremities     Decreased range of motion (ROM) of left knee      Physician: Yusef Lang MD    Physician Orders: PT Eval and Treat     Medical Diagnosis from Referral: C49.22 (ICD-10-CM) - Sarcoma of thigh, left  Note: Status post 30-pound sarcoma resection from inner left thigh (in gracilis/adductor longus with some adductor brandi resected as well).  Work with quad, adductor, hamstring strengthening and gait training.    Evaluation Date: 8/10/2020  Authorization Period Expiration: 12/31/20  Plan of Care Expiration: 11/02/20 (12 weeks)  Re-assessment: 9/10/20  Visit # / Visits authorized: 1/ 20    Time In: 1215  Time Out: 1300  Total Appointment Time (timed & untimed codes): 45 minutes    Precautions: Recent Hx of Skin cancer with post Op.     Subjective     Date of onset: S/P 7/24/20; stitches out on 8/7/20    History of current condition - Tito reports feeling good about the procedure; "I was up and moving the next day". Patient reports having a tumor in the left leg". He reports MD said her was heeling perfectly, and to continue with his usual activities. He had a meniscus Sx about two years ago as well; and has R knee pain. He denies pain at the L thigh post op; "it is just tight". Patient has difficulty with riding his bike, stairs (down<>Up difficulty due to balance and pain at the knees), prolonged walking; transitions to standing. Patient reports able to go for 4-5 miles and more on his bike 2x week (T/TH); he also has an indoor bike 3x/week, and he has lost some weight with exercises. Patient is able to tolerate 1 block walking. He is not " "sure if it is okay to do bike, (PT advised against this to avoid friction to Sx site).      Medical History:   Past Medical History:   Diagnosis Date    Hypertension        Surgical History:   Tito Stanley  has a past surgical history that includes Hernia repair and Surgical removal of sarcoma of lower extremity (Left, 7/24/2020).    Medications:   Tito has a current medication list which includes the following prescription(s): acetaminophen, amlodipine, bumetanide, clotrimazole-betamethasone 1-0.05%, ibuprofen, losartan, meloxicam, metformin, oxycodone, and sulfamethoxazole-trimethoprim 800-160mg.    Allergies:   Review of patient's allergies indicates:  No Known Allergies     Imaging: See epic    Prior Therapy: None  Social History:  Lives with wife in a single level home.   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   Prior Level of Function: Unlimited  Current Level of Function: Mild to mod limitation.     Pain:  Current 0/10, worst 0/10, best 0/10 (Left thigh)  Current 0/10 at rest sitting down), worst 7-8/10 (walking w/o medication aleve), best 0/10 at rest (Left Knee)    Location: right and left Knees (L>R)  Description: Aching  Aggravating Factors: Walking, Getting out of bed/chair and standing  Easing Factors: pain medication and rest    Patient's goals: "Be with leg strength, back to riding bikes; walking better if I can".     Objective     Postural examination/scapula alignment: Rounded shoulder, Head forward and Slouched posture  Palpations: Mod TTP at L thigh s/p Sx  Skin integrity: No signs of infection; scar forming around Sx site  Edema: Mild    LE AROM RIGHT LEFT   Hip flexion WNL WFL   Hip extension WNL WNL   Hip Abduction WNL WNL   Knee flexion 120 115 ERP   Knee extension 5 2   Ankle DF WNL WNL   Ankle PF WNL WNL     NEUROLOGICAL SCREENING     Sensory deficit: Minimally (diminished)  Impaired at L thigh and distal region    Reflexes: DNT today    Lower Extremity " "Strength  Right LE  Left LE    Hip flexion: 4+/5 Hip flexion: 4/5   Hip extension:  5/5 Hip extension: 5/5   Hip abduction: 5/5 Hip abduction: 5/5   Hip adduction:  5/5 Hip adduction:  5/5   Hip Internal rotation   4+/5 Hip Internal rotation 4/5   Knee Flexion 5/5 Knee Flexion 4+/5   Knee Extension 5/5 Knee Extension 5/5   Ankle dorsiflexion: 5/5 Ankle dorsiflexion: 5/5   Ankle plantarflexion: 5/5 Ankle plantarflexion: 5/5     5x Sit to Stand: Assess at next visit    GAIT:  Assistive Device used: none  Level of Assistance: independent  Patient displays the following gait deviations: unsteady gait, decreased step length and antalgic gait.    SPECIAL TESTS:  DNT today    FOTO:        TREATMENT     Total Treatment time (time-based codes) separate from Evaluation: 15 minutes    Tito received therapeutic exercises to develop strength, endurance, ROM, flexibility, posture and core stabilization for 15 minutes including:  GS stretch 3x 20"  HS stretch 3x 20"  SLR supine x15 B  LAQ x15 B  BLFO x15 B  Supine Bridges x10    Home Exercises and Patient Education Provided  Education provided:   Written Home Exercises Provided: yes.  Exercises were reviewed and Tito was able to demonstrate them prior to the end of the session.  Tito demonstrated good  understanding of the education provided.     See EMR under Patient Instructions for exercises provided 8/10/2020.    Assessment   Tito is a 49 y.o. male referred to outpatient Physical Therapy with a medical diagnosis of Sarcoma of thigh of left thigh, and is s/p op . Patient presents with large sx scar healing well, w/o signs of infection. Assessment revealed impaired functional mobility, balance, gait, and endurance; decreased functional activity tolerance (per FOTO score of 44% limitation/impairment); weakness of both lower extremities; and decreased range of motion (ROM) of left knee (possibly secondary to tissue tightness). Patient prognosis is Good.   Patientt will benefit " from skilled outpatient Physical Therapy to address the deficits stated above and in the chart below, provide patient /family education, and to maximize patientt's level of independence.     Plan of care discussed with patient: Yes  Patient's spiritual, cultural and educational needs considered and patient is agreeable to the plan of care and goals as stated below:     Anticipated Barriers for therapy: None    Medical Necessity is demonstrated by the following  History  Co-morbidities and personal factors that may impact the plan of care Co-morbidities:   Post Op Skin sarcoma.    Personal Factors:   no deficits     low   Examination  Body Structures and Functions, activity limitations and participation restrictions that may impact the plan of care Body Regions:   lower extremities  trunk    Body Systems:    ROM  strength  balance  gait  transfers  transitions  edema  scar formation  skin integrity    Participation Restrictions:   Per Post Op precautions    Activity limitations:   Learning and applying knowledge  no deficits    General Tasks and Commands  no deficits    Communication  no deficits    Mobility  lifting and carrying objects  walking  driving (bike, car, motorcycle)  Stairs, running, biking    Self care  no deficits    Domestic Life  shopping  cooking  doing house work (cleaning house, washing dishes, laundry)  assisting others    Interactions/Relationships  no deficits    Life Areas  no deficits    Community and Social Life  community life  recreation and leisure         low   Clinical Presentation stable and uncomplicated low   Decision Making/ Complexity Score: low     GOALS:  STG 6 Weeks:  1.Patientt able to perform HEP correctly with minimal cueing or supervision from therapist to encourage independent management of symptoms.  2. Patient will ambulate for 1 mile, w/o increasing pain and with improving gait mechanics,  to demonstrate increasing ambulation tolerance w/ decreasing difficulty.  3.  Patient will display alternating and reciprocal stair climbing (up<>down) unsupported w/o increasing pain by more than 1 point.    LTG 12 Weeks:  1. Pt to demonstrate ability to independently control and reduce their pain through posture positioning and mechanical movements throughout a typical day.  2. Patient will improve 5x STS test for functional legs strength to 7 seconds or less, w/o increasing pain, to demonstrate improved functional legs strength.  3. Patient will ride bicycle for 2-3 miles w/o increasing pain, to demonstrate increasing biking tolerance w/o limitation to return to PL of activity.  4. Patient will demonstrate improved overall function per FOTO Survey to <20% impaired, limited or restricted score or less in order to show subjective improvement of condition.  5. Pt will demonstrate independence with the HEP at discharge  6. Patient will demonstrate 120 deg L knee flexion      Plan   Plan of care Certification: 8/10/2020 to 11/2/20.    Outpatient Physical Therapy 2 times weekly for 12 weeks to include the following interventions: Electrical Stimulation as needed, Gait Training, Manual Therapy, Moist Heat/ Ice, Neuromuscular Re-ed, Patient Education, Self Care, Therapeutic Activites, Therapeutic Exercise and dry needling as needed. .     Jessica Pinto, PT

## 2020-08-12 ENCOUNTER — CLINICAL SUPPORT (OUTPATIENT)
Dept: REHABILITATION | Facility: OTHER | Age: 50
End: 2020-08-12
Payer: COMMERCIAL

## 2020-08-12 DIAGNOSIS — M25.662 DECREASED RANGE OF MOTION (ROM) OF LEFT KNEE: ICD-10-CM

## 2020-08-12 DIAGNOSIS — R68.89 DECREASED FUNCTIONAL ACTIVITY TOLERANCE: ICD-10-CM

## 2020-08-12 DIAGNOSIS — R29.898 WEAKNESS OF BOTH LOWER EXTREMITIES: ICD-10-CM

## 2020-08-12 DIAGNOSIS — Z74.09 IMPAIRED FUNCTIONAL MOBILITY, BALANCE, GAIT, AND ENDURANCE: ICD-10-CM

## 2020-08-12 PROCEDURE — 97110 THERAPEUTIC EXERCISES: CPT | Mod: PN | Performed by: INTERNAL MEDICINE

## 2020-08-12 NOTE — PROGRESS NOTES
"  [facility]  Rx note     Date: 8/10/2020   Name: Tito Stanley  Clinic Number: [id number]     Therapy Diagnosis:        Encounter Diagnoses   Name Primary?   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  Work with quad, adductor, hamstring strengthening and gait training.     Evaluation Date: 8/10/2020  Authorization Period Expiration: 12/31/20  Plan of Care Expiration: 11/02/20 (12 weeks)  Re-assessment: 9/10/20  Visit # / Visits authorized: 2/ 20     Time In: 11:45 A  Time Out: 12:30 P  Total Appointment Time (timed & untimed codes): 45 minutes     Precautions: Recent Hx of Skin cancer with post Op.  L menisectomy few years ago with no PT     Subjective      Feeling good and wants to r/t riding his bike again.        Pain:  Current 0/10, worst 0/10, best 0/10 (Left thigh)  Location: right and left Knees (L>R)   Aggravating Factors: Walking, Getting out of bed/chair and standing  Easing Factors: pain medication and rest     Patient's goals: "Be with leg strength, back to riding bikes; walking better if I can".      Objective       Palpations: Mod TTP at L thigh s/p Sx, scar stiffness     8/10   LE AROM RIGHT LEFT   Hip flexion WNL WFL   Hip extension WNL WNL   Hip Abduction WNL WNL   Knee flexion 120 115 ERP   Knee extension 5 2   Ankle DF WNL WNL   Ankle PF WNL WNL           8/10 Lower Extremity Strength  Right LE   Left LE     Hip flexion: 4+/5 Hip flexion: 4/5   Hip extension:  5/5 Hip extension: 5/5   Hip " "abduction: 5/5 Hip abduction: 5/5   Hip adduction:  5/5 Hip adduction:  5/5   Hip Internal rotation    4+/5 Hip Internal rotation 4/5   Knee Flexion 5/5 Knee Flexion 4+/5   Knee Extension 5/5 Knee Extension 5/5   Ankle dorsiflexion: 5/5 Ankle dorsiflexion: 5/5   Ankle plantarflexion: 5/5 Ankle plantarflexion: 5/5      5x Sit to Stand: Assess at next visit     GAIT:  Assistive Device used: none  Level of Assistance: independent  Patient displays the following gait deviations: unsteady gait, decreased step length and antalgic gait.           8/10 FOTO:          TREATMENT      Total Treatment time (time-based codes) separate from Evaluation: 45 minutes     Tito received therapeutic exercises to develop strength, endurance, ROM, flexibility, posture and core stabilization for 15 minutes including:  Recumb bike 6 min   GSS stretch 3x 20"  HS stretch 3x 20"   SLR supine 2 x15 B  LAQ  2x15 B  BLFO 2 x15 B  Supine Bridges 3 x10  Clams 30x  Mini squats 20x  B HR 30x  Lat step up 2 in 20x     SLS 10 sec x 3 (  Limited R)   Marching 1 min - mild L knee joint pain      Home Exercises and Patient Education Provided  Education provided:   Written Home Exercises Provided: yes.  Exercises were reviewed and Tito was able to demonstrate them prior to the end of the session.  Tito demonstrated good  understanding of the education provided.      See EMR under Patient Instructions for exercises provided 8/10/2020.     Assessment    Pt with improved ex ability with no c/o except fatigue.     Patient will benefit from skilled outpatient Physical Therapy to address the deficits stated above and in the chart below, provide patient /family education, and to maximize patientt's level of independence.         Patient's spiritual, cultural and educational needs considered and patient is agreeable to the plan of care and goals as stated below:      Anticipated Barriers for therapy: None          GOALS:  STG 6 Weeks:  1.Patientt able to " perform HEP correctly with minimal cueing or supervision from therapist to encourage independent management of symptoms.  2. Patient will ambulate for 1 mile, w/o increasing pain and with improving gait mechanics,  to demonstrate increasing ambulation tolerance w/ decreasing difficulty.  3. Patient will display alternating and reciprocal stair climbing (up<>down) unsupported w/o increasing pain by more than 1 point.     LTG 12 Weeks:  1. Pt to demonstrate ability to independently control and reduce their pain through posture positioning and mechanical movements throughout a typical day.  2. Patient will improve 5x STS test for functional legs strength to 7 seconds or less, w/o increasing pain, to demonstrate improved functional legs strength.  3. Patient will ride bicycle for 2-3 miles w/o increasing pain, to demonstrate increasing biking tolerance w/o limitation to return to PL of activity.  4. Patient will demonstrate improved overall function per FOTO Survey to <20% impaired, limited or restricted score or less in order to show subjective improvement of condition.  5. Pt will demonstrate independence with the HEP at discharge  6. Patient will demonstrate 120 deg L knee flexion

## 2020-08-13 LAB
FINAL PATHOLOGIC DIAGNOSIS: NORMAL
FROZEN SECTION DIAGNOSIS: NORMAL
GROSS: NORMAL
SUPPLEMENTAL DIAGNOSIS: NORMAL

## 2020-08-17 ENCOUNTER — CLINICAL SUPPORT (OUTPATIENT)
Dept: REHABILITATION | Facility: OTHER | Age: 50
End: 2020-08-17
Payer: COMMERCIAL

## 2020-08-17 DIAGNOSIS — R29.898 WEAKNESS OF BOTH LOWER EXTREMITIES: ICD-10-CM

## 2020-08-17 DIAGNOSIS — Z74.09 IMPAIRED FUNCTIONAL MOBILITY, BALANCE, GAIT, AND ENDURANCE: ICD-10-CM

## 2020-08-17 DIAGNOSIS — M25.662 DECREASED RANGE OF MOTION (ROM) OF LEFT KNEE: ICD-10-CM

## 2020-08-17 DIAGNOSIS — R68.89 DECREASED FUNCTIONAL ACTIVITY TOLERANCE: ICD-10-CM

## 2020-08-17 PROCEDURE — 97110 THERAPEUTIC EXERCISES: CPT | Mod: PN,CQ

## 2020-08-17 PROCEDURE — 97110 THERAPEUTIC EXERCISES: CPT | Mod: PN | Performed by: INTERNAL MEDICINE

## 2020-08-17 NOTE — PROGRESS NOTES
"  [facility]  Rx note     Date: 8/10/2020   Name: Tito Stanley  Clinic Number: [id number]     Therapy Diagnosis:        Encounter Diagnoses   Name Primary?   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  Work with quad, adductor, hamstring strengthening and gait training.     Evaluation Date: 8/10/2020  Authorization Period Expiration: 12/31/20  Plan of Care Expiration: 11/02/20 (12 weeks)  Re-assessment: 9/10/20  Visit # / Visits authorized: 3/ 20     Time In: 1130  Time Out: 1200  Total Appointment Time (timed & untimed codes): 30 minutes     Precautions: Recent Hx of Skin cancer with post Op.  L menisectomy few years ago with no PT     Subjective      Feeling good and wants to r/t riding his bike again.        Pain:  Current 0/10, worst 0/10, best 0/10 (Left thigh)  Location: right and left Knees (L>R)   Aggravating Factors: Walking, Getting out of bed/chair and standing  Easing Factors: pain medication and rest     Patient's goals: "Be with leg strength, back to riding bikes; walking better if I can".      Objective       Palpations: Mod TTP at L thigh s/p Sx, scar stiffness     8/10   LE AROM RIGHT LEFT   Hip flexion WNL WFL   Hip extension WNL WNL   Hip Abduction WNL WNL   Knee flexion 120 115 ERP   Knee extension 5 2   Ankle DF WNL WNL   Ankle PF WNL WNL           8/10 Lower Extremity Strength  Right LE   Left LE     Hip flexion: 4+/5 Hip flexion: 4/5   Hip extension:  5/5 Hip extension: 5/5   Hip " "abduction: 5/5 Hip abduction: 5/5   Hip adduction:  5/5 Hip adduction:  5/5   Hip Internal rotation    4+/5 Hip Internal rotation 4/5   Knee Flexion 5/5 Knee Flexion 4+/5   Knee Extension 5/5 Knee Extension 5/5   Ankle dorsiflexion: 5/5 Ankle dorsiflexion: 5/5   Ankle plantarflexion: 5/5 Ankle plantarflexion: 5/5      5x Sit to Stand: Assess at next visit     GAIT:  Assistive Device used: none  Level of Assistance: independent  Patient displays the following gait deviations: unsteady gait, decreased step length and antalgic gait.           8/10 FOTO:          TREATMENT           Ray received therapeutic exercises to develop strength, endurance, ROM, flexibility, posture and core stabilization for 30 minutes including:  Recumb bike 6 min   GSS stretch 3x 20"  HS stretch 3x 20"   SLR supine 2 x15 B  LAQ  2x15 B  BLFO 2 x15 B  Supine Bridges 3 x10  Clams 30x  Mini squats 20x  B HR 30x  Lat step up 2 in 20x     SLS 10 sec x 3 (  Limited R)   Marching 1 min - mild L knee joint pain      Home Exercises and Patient Education Provided  Education provided:   Written Home Exercises Provided: yes.  Exercises were reviewed and Tito was able to demonstrate them prior to the end of the session.  Tito demonstrated good  understanding of the education provided.      See EMR under Patient Instructions for exercises provided 8/10/2020.     Assessment    Co-treated with PT. Pt tolerated exercise well. Weakness in quads is noted at this time. Will cont to work on improving strengthen /endiurance in hip/knee musculature. PT/PTA face to face conference with Della Mart PT concerning pt status/TX.      Patient will benefit from skilled outpatient Physical Therapy to address the deficits stated above and in the chart below, provide patient /family education, and to maximize patientt's level of independence.         Patient's spiritual, cultural and educational needs considered and patient is agreeable to the plan of care and goals " as stated below:      Anticipated Barriers for therapy: None          GOALS:  STG 6 Weeks:  1.Patientt able to perform HEP correctly with minimal cueing or supervision from therapist to encourage independent management of symptoms.  2. Patient will ambulate for 1 mile, w/o increasing pain and with improving gait mechanics,  to demonstrate increasing ambulation tolerance w/ decreasing difficulty.  3. Patient will display alternating and reciprocal stair climbing (up<>down) unsupported w/o increasing pain by more than 1 point.     LTG 12 Weeks:  1. Pt to demonstrate ability to independently control and reduce their pain through posture positioning and mechanical movements throughout a typical day.  2. Patient will improve 5x STS test for functional legs strength to 7 seconds or less, w/o increasing pain, to demonstrate improved functional legs strength.  3. Patient will ride bicycle for 2-3 miles w/o increasing pain, to demonstrate increasing biking tolerance w/o limitation to return to PL of activity.  4. Patient will demonstrate improved overall function per FOTO Survey to <20% impaired, limited or restricted score or less in order to show subjective improvement of condition.  5. Pt will demonstrate independence with the HEP at discharge  6. Patient will demonstrate 120 deg L knee flexion

## 2020-08-17 NOTE — PROGRESS NOTES
"  [facility]  Rx note     Date: 8/10/2020   Name: Tito Stanley  Clinic Number: [id number]     Therapy Diagnosis:        Encounter Diagnoses   Name Primary?   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  Work with quad, adductor, hamstring strengthening and gait training.     Evaluation Date: 8/10/2020  Authorization Period Expiration: 12/31/20  Plan of Care Expiration: 11/02/20 (12 weeks)  Re-assessment: 9/10/20  Visit # / Visits authorized: 3/ 20     Time In: 11:25 A  Time Out: 12::10 P  Total Appointment Time (timed & untimed codes): 45 minutes     Precautions: Recent Hx of Skin cancer with post Op.  L menisectomy few years ago with no PT     Subjective      Feeling good , no c/o after last visit       Pain:  Current 0/10, worst 0/10, best 0/10 (Left thigh)  Location: right and left Knees (L>R)   Aggravating Factors: Walking, Getting out of bed/chair and standing  Easing Factors: pain medication and rest     Patient's goals: "Be with leg strength, back to riding bikes; walking better if I can".      Objective       Palpations: Mod TTP at L thigh s/p Sx, scar stiffness     8/10   LE AROM RIGHT LEFT   Hip flexion WNL WFL   Hip extension WNL WNL   Hip Abduction WNL WNL   Knee flexion 120 115 ERP   Knee extension 5 2   Ankle DF WNL WNL   Ankle PF WNL WNL           8/10 Lower Extremity Strength  Right LE   Left LE     Hip flexion: 4+/5 Hip flexion: 4/5   Hip extension:  5/5 Hip extension: 5/5   Hip abduction: 5/5 " "Hip abduction: 5/5   Hip adduction:  5/5 Hip adduction:  5/5   Hip Internal rotation    4+/5 Hip Internal rotation 4/5   Knee Flexion 5/5 Knee Flexion 4+/5   Knee Extension 5/5 Knee Extension 5/5   Ankle dorsiflexion: 5/5 Ankle dorsiflexion: 5/5   Ankle plantarflexion: 5/5 Ankle plantarflexion: 5/5      5x Sit to Stand: Assess at next visit     GAIT:  Assistive Device used: none  Level of Assistance: independent  Patient displays the following gait deviations: unsteady gait, decreased step length and antalgic gait.           8/10 FOTO:          TREATMENT      Total Treatment time (time-based codes) separate from Evaluation: 45 minutes     Tito received therapeutic exercises to develop strength, endurance, ROM, flexibility, posture and core stabilization for 15 minutes including:  Recumb bike 6 min   GSS stretch 3x 20"  HS stretch 3x 20"   SLR supine 2 x15 B  LAQ  2x15 B  BLFO 2 x15 B  Supine Bridges 3 x10  Clams 30x  Mini squats 20x  B HR 30x  Lat step up 2 in 20x       Above ex with PTA,   Below ex performed with PT for approx 20  min :   Clams gtb 30x performed   BLFO 2 x15 B  U shuttle 37 # 3 x 10   SLS 15 sec x 3 (  Limited R)   Marching 1 min - mild L knee joint pain      Home Exercises and Patient Education Provided  Education provided:   Written Home Exercises Provided: yes. Issued gtb for above ex   Exercises were reviewed and Tito was able to demonstrate them prior to the end of the session.  Tito demonstrated good  understanding of the education provided.      See EMR under Patient Instructions for exercises provided 8/10/2020.     Assessment    Pt with improved ex ability with no c/o except fatigue.  Cont L mild knee joint pain with balance ex. Cues to slow down with  Leg press.      Patient will benefit from skilled outpatient Physical Therapy to address the deficits stated above and in the chart below, provide patient /family education, and to maximize patientt's level of independence.       "   Patient's spiritual, cultural and educational needs considered and patient is agreeable to the plan of care and goals as stated below:      Anticipated Barriers for therapy: None          GOALS:  STG 6 Weeks:  1.Patientt able to perform HEP correctly with minimal cueing or supervision from therapist to encourage independent management of symptoms.  2. Patient will ambulate for 1 mile, w/o increasing pain and with improving gait mechanics,  to demonstrate increasing ambulation tolerance w/ decreasing difficulty.  3. Patient will display alternating and reciprocal stair climbing (up<>down) unsupported w/o increasing pain by more than 1 point.     LTG 12 Weeks:  1. Pt to demonstrate ability to independently control and reduce their pain through posture positioning and mechanical movements throughout a typical day.  2. Patient will improve 5x STS test for functional legs strength to 7 seconds or less, w/o increasing pain, to demonstrate improved functional legs strength.  3. Patient will ride bicycle for 2-3 miles w/o increasing pain, to demonstrate increasing biking tolerance w/o limitation to return to PL of activity.  4. Patient will demonstrate improved overall function per FOTO Survey to <20% impaired, limited or restricted score or less in order to show subjective improvement of condition.  5. Pt will demonstrate independence with the HEP at discharge  6. Patient will demonstrate 120 deg L knee flexion    Plan- Cont PT x 6 weeks.

## 2020-08-19 ENCOUNTER — CLINICAL SUPPORT (OUTPATIENT)
Dept: REHABILITATION | Facility: OTHER | Age: 50
End: 2020-08-19
Payer: COMMERCIAL

## 2020-08-19 DIAGNOSIS — R29.898 WEAKNESS OF BOTH LOWER EXTREMITIES: ICD-10-CM

## 2020-08-19 DIAGNOSIS — R68.89 DECREASED FUNCTIONAL ACTIVITY TOLERANCE: ICD-10-CM

## 2020-08-19 DIAGNOSIS — Z74.09 IMPAIRED FUNCTIONAL MOBILITY, BALANCE, GAIT, AND ENDURANCE: ICD-10-CM

## 2020-08-19 DIAGNOSIS — M25.662 DECREASED RANGE OF MOTION (ROM) OF LEFT KNEE: ICD-10-CM

## 2020-08-19 PROCEDURE — 97110 THERAPEUTIC EXERCISES: CPT | Mod: PN,CQ

## 2020-08-19 NOTE — PROGRESS NOTES
"  [facility]  Rx note     Date: 8/10/2020   Name: Tito Stanley  Clinic Number: [id number]     Therapy Diagnosis:        Encounter Diagnoses   Name Primary?   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  Work with quad, adductor, hamstring strengthening and gait training.     Evaluation Date: 8/10/2020  Authorization Period Expiration: 12/31/20  Plan of Care Expiration: 11/02/20 (12 weeks)  Re-assessment: 9/10/20  Visit # / Visits authorized: 4/ 20     Time In: 1123   Time Out: 1210  Total Appointment Time (timed & untimed codes): 47 minutes     Precautions: Recent Hx of Skin cancer with post Op.  L menisectomy few years ago with no PT     Subjective      Pt reports feeling more pain in his L knee.States he felt like some of the exercises last visit. States he feels more of his pain below the knee.        Pain:  Current 6/10, worst 6/10, best 0/10 (Left thigh)  Location: right and left Knees (L>R)   Aggravating Factors: Walking, Getting out of bed/chair and standing  Easing Factors: pain medication and rest     Patient's goals: "Be with leg strength, back to riding bikes; walking better if I can".      Objective       Palpations: Mod TTP at L thigh s/p Sx, scar stiffness     8/10   LE AROM RIGHT LEFT   Hip flexion WNL WFL   Hip extension WNL WNL   Hip Abduction WNL WNL   Knee flexion 120 115 ERP   Knee extension 5 2   Ankle DF WNL WNL   Ankle PF WNL WNL           8/10 Lower Extremity Strength  Right LE   Left LE   " "  Hip flexion: 4+/5 Hip flexion: 4/5   Hip extension:  5/5 Hip extension: 5/5   Hip abduction: 5/5 Hip abduction: 5/5   Hip adduction:  5/5 Hip adduction:  5/5   Hip Internal rotation    4+/5 Hip Internal rotation 4/5   Knee Flexion 5/5 Knee Flexion 4+/5   Knee Extension 5/5 Knee Extension 5/5   Ankle dorsiflexion: 5/5 Ankle dorsiflexion: 5/5   Ankle plantarflexion: 5/5 Ankle plantarflexion: 5/5      5x Sit to Stand: Assess at next visit     GAIT:  Assistive Device used: none  Level of Assistance: independent  Patient displays the following gait deviations: unsteady gait, decreased step length and antalgic gait.           8/10 FOTO:          TREATMENT           Tito received therapeutic exercises to develop strength, endurance, ROM, flexibility, posture and core stabilization for 47 minutes including:    Recumb bike 6 min   GSS stretch 3x 20"  HS stretch 3x 20"   SLR supine 2 x15 B  LAQ  2x15 B  BLFO 2 x15 B  Supine Bridges 3 x10  Clams 30x  Mini squats 20x  B HR 30x   Lat step up 2 in 20x      SLS 10 sec x 3 (  Limited R) - NV  Marching 1 min - mild L knee joint pain      Home Exercises and Patient Education Provided  Education provided:   Written Home Exercises Provided: yes.  Exercises were reviewed and Tito was able to demonstrate them prior to the end of the session.  Tito demonstrated good  understanding of the education provided.      See EMR under Patient Instructions for exercises provided 8/10/2020.     Assessment    Pt tolerated exercise well. Weakness in quads continue to be notable at this time.  Pt reported pain was local to the tibial tuberosity/patella tendon and was reported increased with WB/squats. Will continue focusing on improving quad/hip muscle strength.      Patient will benefit from skilled outpatient Physical Therapy to address the deficits stated above and in the chart below, provide patient /family education, and to maximize patientt's level of independence.         Patient's " spiritual, cultural and educational needs considered and patient is agreeable to the plan of care and goals as stated below:      Anticipated Barriers for therapy: None          GOALS:  STG 6 Weeks:  1.Patientt able to perform HEP correctly with minimal cueing or supervision from therapist to encourage independent management of symptoms.  2. Patient will ambulate for 1 mile, w/o increasing pain and with improving gait mechanics,  to demonstrate increasing ambulation tolerance w/ decreasing difficulty.  3. Patient will display alternating and reciprocal stair climbing (up<>down) unsupported w/o increasing pain by more than 1 point.     LTG 12 Weeks:  1. Pt to demonstrate ability to independently control and reduce their pain through posture positioning and mechanical movements throughout a typical day.  2. Patient will improve 5x STS test for functional legs strength to 7 seconds or less, w/o increasing pain, to demonstrate improved functional legs strength.  3. Patient will ride bicycle for 2-3 miles w/o increasing pain, to demonstrate increasing biking tolerance w/o limitation to return to PL of activity.  4. Patient will demonstrate improved overall function per FOTO Survey to <20% impaired, limited or restricted score or less in order to show subjective improvement of condition.  5. Pt will demonstrate independence with the HEP at discharge  6. Patient will demonstrate 120 deg L knee flexion         Plan   Plan of care Certification: 8/10/2020 to 11/2/20.    Continue with current POC for further strengthening, flexibility, improve ROM and ADL performance. Will progress functional/work related there-ex as tolerated.        Outpatient Physical Therapy 2 times weekly for 12 weeks to include the following interventions: Electrical Stimulation as needed, Gait Training, Manual Therapy, Moist Heat/ Ice, Neuromuscular Re-ed, Patient Education, Self Care, Therapeutic Activites, Therapeutic Exercise and dry needling as  needed. .

## 2020-08-24 ENCOUNTER — CLINICAL SUPPORT (OUTPATIENT)
Dept: REHABILITATION | Facility: OTHER | Age: 50
End: 2020-08-24
Payer: COMMERCIAL

## 2020-08-24 DIAGNOSIS — Z74.09 IMPAIRED FUNCTIONAL MOBILITY, BALANCE, GAIT, AND ENDURANCE: ICD-10-CM

## 2020-08-24 DIAGNOSIS — M25.662 DECREASED RANGE OF MOTION (ROM) OF LEFT KNEE: ICD-10-CM

## 2020-08-24 DIAGNOSIS — R68.89 DECREASED FUNCTIONAL ACTIVITY TOLERANCE: ICD-10-CM

## 2020-08-24 DIAGNOSIS — R29.898 WEAKNESS OF BOTH LOWER EXTREMITIES: ICD-10-CM

## 2020-08-24 PROCEDURE — 97110 THERAPEUTIC EXERCISES: CPT | Mod: PN

## 2020-08-24 NOTE — PROGRESS NOTES
"  [facility]  Rx note     Date: 8/10/2020   Name: Tito Stanley  Clinic Number: [id number]     Therapy Diagnosis:        Encounter Diagnoses   Name Primary?   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    Work with quad, adductor, hamstring strengthening and gait training.     Evaluation Date: 8/10/2020  Authorization Period Expiration: 12/31/20  Plan of Care Expiration: 11/02/20 (12 weeks)  Re-assessment: 9/10/20  Visit # / Visits authorized: 5/ 20     Time In: 1132  Time Out: 1215  Total Appointment Time (timed & untimed codes): 43 minutes     Precautions: Recent Hx of Skin cancer with post Op.  L menisectomy few years ago with no PT     Subjective      Patient reports no pain upon arrival at [facility], but reports he was moderately sore following last session mostly at the knees. The arthritis is bothering him.        Pain:  Current 6/10, worst 6/10, best 0/10 (Left thigh)  Location: right and left Knees (L>R)   Aggravating Factors: Walking, Getting out of bed/chair and standing  Easing Factors: pain medication and rest     Patient's goals: "Be with leg strength, back to riding bikes; walking better if I can".      Objective     8/24/20   Palpations: Mod TTP at L thigh s/p Sx, scar stiffness   5x Sit to Stand: 9 seconds no new c/o.  Timed Up and Go: 7 seconds with antalgic and unsteady gait.     8/10 FOTO:          TREATMENT      Tito received therapeutic exercises to develop strength, endurance, ROM, " "flexibility, posture and core stabilization for 43 minutes including:    Recumb bike 6 min at  L 2   GSS stretch 3x 20"  HS stretch 3x 20"   SLR supine 2 x15 L only today  LAQ  2x15 L only today  BLFO 2 x15 B with GTB  Supine Bridges 2x 15 reps   Clams 30x (DNC)  Mini squats 20x  B Heel and Toe raises 30x   Lat step up 2 in 20x    +Standing HS curls B with 4# ankle weight 2x 15 with range as tolerated  SLS (unsupported)  going for max stance time each side (untimed) 3 minutes clock  Alternating Marching with 4# ankle weights B 2x 10  (with 1 UE support)      Home Exercises and Patient Education Provided  Education provided:   Written Home Exercises Provided: yes.  Exercises were reviewed and Tito was able to demonstrate them prior to the end of the session.  Tito demonstrated good  understanding of the education provided.      See EMR under Patient Instructions for exercises provided 8/10/2020.     Assessment     Patient is tolerated rehab well; with main limitation being his knees (arthritis). He continued to display impaired gait quality due to knee pain. Continue to address LE muscle flexibility and strength.      Patient will benefit from skilled outpatient Physical Therapy to address the deficits stated above and in the chart below, provide patient /family education, and to maximize patientt's level of independence.   Patient's spiritual, cultural and educational needs considered and patient is agreeable to the plan of care and goals as stated below:      Anticipated Barriers for therapy: None          GOALS:  STG 6 Weeks:  1.Patientt able to perform HEP correctly with minimal cueing or supervision from therapist to encourage independent management of symptoms.  2. Patient will ambulate for 1 mile, w/o increasing pain and with improving gait mechanics,  to demonstrate increasing ambulation tolerance w/ decreasing difficulty.  3. Patient will display alternating and reciprocal stair climbing (up<>down) " unsupported w/o increasing pain by more than 1 point.     LTG 12 Weeks:  1. Pt to demonstrate ability to independently control and reduce their pain through posture positioning and mechanical movements throughout a typical day.  2. Patient will improve 5x STS test for functional legs strength to 7 seconds or less, w/o increasing pain, to demonstrate improved functional legs strength.  3. Patient will ride bicycle for 2-3 miles w/o increasing pain, to demonstrate increasing biking tolerance w/o limitation to return to PL of activity.  4. Patient will demonstrate improved overall function per FOTO Survey to <20% impaired, limited or restricted score or less in order to show subjective improvement of condition.  5. Pt will demonstrate independence with the HEP at discharge  6. Patient will demonstrate 120 deg L knee flexion         Plan   Plan of care Certification: 8/10/2020 to 11/2/20.    Continue with current POC for further strengthening, flexibility, improve ROM and ADL performance. Will progress functional/work related there-ex as tolerated.        Outpatient Physical Therapy 2 times weekly for 12 weeks to include the following interventions: Electrical Stimulation as needed, Gait Training, Manual Therapy, Moist Heat/ Ice, Neuromuscular Re-ed, Patient Education, Self Care, Therapeutic Activites, Therapeutic Exercise and dry needling as needed. .

## 2020-08-26 ENCOUNTER — CLINICAL SUPPORT (OUTPATIENT)
Dept: REHABILITATION | Facility: OTHER | Age: 50
End: 2020-08-26
Payer: COMMERCIAL

## 2020-08-26 DIAGNOSIS — M25.662 DECREASED RANGE OF MOTION (ROM) OF LEFT KNEE: ICD-10-CM

## 2020-08-26 DIAGNOSIS — R68.89 DECREASED FUNCTIONAL ACTIVITY TOLERANCE: ICD-10-CM

## 2020-08-26 DIAGNOSIS — R29.898 WEAKNESS OF BOTH LOWER EXTREMITIES: ICD-10-CM

## 2020-08-26 DIAGNOSIS — Z74.09 IMPAIRED FUNCTIONAL MOBILITY, BALANCE, GAIT, AND ENDURANCE: ICD-10-CM

## 2020-08-26 PROCEDURE — 97110 THERAPEUTIC EXERCISES: CPT | Mod: PN,CQ

## 2020-08-26 NOTE — PROGRESS NOTES
"  [facility]  Rx note     Date: 8/10/2020   Name: Tito Stanley  Clinic Number: [id number]     Therapy Diagnosis:        Encounter Diagnoses   Name Primary?   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    Work with quad, adductor, hamstring strengthening and gait training.     Evaluation Date: 8/10/2020  Authorization Period Expiration: 12/31/20  Plan of Care Expiration: 11/02/20 (12 weeks)  Re-assessment: 9/10/20  Visit # / Visits authorized: 6/ 20     Time In: 1130  Time Out: 1215  Total Appointment Time (timed & untimed codes): 45 minutes     Precautions: Recent Hx of Skin cancer with post Op.  L menisectomy few years ago with no PT     Subjective      Pt reports feeling better today. States he was not as sore as he would have thought after his last PT session.        Pain:  Current 4/10, worst 6/10, best 0/10 (Left thigh)  Location: right and left Knees (L>R)   Aggravating Factors: Walking, Getting out of bed/chair and standing  Easing Factors: pain medication and rest     Patient's goals: "Be with leg strength, back to riding bikes; walking better if I can".      Objective     8/24/20   Palpations: Mod TTP at L thigh s/p Sx, scar stiffness   5x Sit to Stand: 9 seconds no new c/o.  Timed Up and Go: 7 seconds with antalgic and unsteady gait.     8/10 FOTO:          TREATMENT      Tito received therapeutic exercises to develop strength, endurance, ROM, flexibility, posture and core stabilization for 45 minutes " "including:    Recumb bike 6 min at  L 2   GSS stretch 2'  HS stretch 3x 20"   SLR supine 2 x15   LAQ  2x15  4#  BLFO 2 x15 B with GTB  Supine Bridges 2x 15 reps   Clams 30x (DNC)  Mini squats 20x  B Heel and Toe raises 30x   Lat step up 2 in 20x    +Standing HS curls B with 4# ankle weight 2x 15 with range as tolerated  SLS (unsupported)  going for max stance time each side (untimed) 3 minutes clock  Alternating Marching with 4# ankle weights B 2x 10  (with 1 UE support)      Home Exercises and Patient Education Provided  Education provided:   Written Home Exercises Provided: Not today.  Exercises were reviewed and Tito was able to demonstrate them prior to the end of the session.  Tito demonstrated good  understanding of the education provided.      See EMR under Patient Instructions for exercises provided 8/10/2020.     Assessment     Pt tolerated TX well with mild complaints. Muscle strength appears to be improving in quads and hamstrings however, deficits remain notable at this time.    Patient will benefit from skilled outpatient Physical Therapy to address the deficits stated above and in the chart below, provide patient /family education, and to maximize patientt's level of independence.   Patient's spiritual, cultural and educational needs considered and patient is agreeable to the plan of care and goals as stated below:      Anticipated Barriers for therapy: None          GOALS:  STG 6 Weeks:  1.Patientt able to perform HEP correctly with minimal cueing or supervision from therapist to encourage independent management of symptoms.  2. Patient will ambulate for 1 mile, w/o increasing pain and with improving gait mechanics,  to demonstrate increasing ambulation tolerance w/ decreasing difficulty.  3. Patient will display alternating and reciprocal stair climbing (up<>down) unsupported w/o increasing pain by more than 1 point.     LTG 12 Weeks:  1. Pt to demonstrate ability to independently control and " reduce their pain through posture positioning and mechanical movements throughout a typical day.  2. Patient will improve 5x STS test for functional legs strength to 7 seconds or less, w/o increasing pain, to demonstrate improved functional legs strength.  3. Patient will ride bicycle for 2-3 miles w/o increasing pain, to demonstrate increasing biking tolerance w/o limitation to return to PL of activity.  4. Patient will demonstrate improved overall function per FOTO Survey to <20% impaired, limited or restricted score or less in order to show subjective improvement of condition.  5. Pt will demonstrate independence with the HEP at discharge  6. Patient will demonstrate 120 deg L knee flexion         Plan   Plan of care Certification: 8/10/2020 to 11/2/20.    Continue with current POC for further strengthening, flexibility, improve ROM and ADL performance. Will progress functional/work related there-ex as tolerated.        Outpatient Physical Therapy 2 times weekly for 12 weeks to include the following interventions: Electrical Stimulation as needed, Gait Training, Manual Therapy, Moist Heat/ Ice, Neuromuscular Re-ed, Patient Education, Self Care, Therapeutic Activites, Therapeutic Exercise and dry needling as needed. .

## 2020-08-31 ENCOUNTER — CLINICAL SUPPORT (OUTPATIENT)
Dept: REHABILITATION | Facility: OTHER | Age: 50
End: 2020-08-31
Payer: COMMERCIAL

## 2020-08-31 DIAGNOSIS — R68.89 DECREASED FUNCTIONAL ACTIVITY TOLERANCE: ICD-10-CM

## 2020-08-31 DIAGNOSIS — Z74.09 IMPAIRED FUNCTIONAL MOBILITY, BALANCE, GAIT, AND ENDURANCE: ICD-10-CM

## 2020-08-31 DIAGNOSIS — R29.898 WEAKNESS OF BOTH LOWER EXTREMITIES: ICD-10-CM

## 2020-08-31 DIAGNOSIS — M25.662 DECREASED RANGE OF MOTION (ROM) OF LEFT KNEE: ICD-10-CM

## 2020-08-31 PROCEDURE — 97110 THERAPEUTIC EXERCISES: CPT | Mod: PN

## 2020-08-31 NOTE — PROGRESS NOTES
"  [facility]  Rx note     Date: 8/10/2020   Name: Tito Stanley  Clinic Number: [id number]     Therapy Diagnosis:        Encounter Diagnoses   Name Primary?   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    Work with quad, adductor, hamstring strengthening and gait training.     Evaluation Date: 8/10/2020  Authorization Period Expiration: 12/31/20  Plan of Care Expiration: 11/02/20 (12 weeks)  Re-assessment: 9/10/20  Visit # / Visits authorized: 7/ 20     Time In: 1130  Time Out: 1215  Total Appointment Time (timed & untimed codes): 45 minutes     Precautions: Recent Hx of Skin cancer with post Op.  L menisectomy few years ago with no PT     Subjective     Tito returns to PT and had no new c/o or concerns regarding progression and direction of rehab. He has no pain at the L thigh region (post op)       Pain:  Current 4/10, worst 6/10, best 0/10 (B Knees)  Location: right and left Knees (L>R)   Aggravating Factors: Walking, Getting out of bed/chair and standing  Easing Factors: pain medication and rest     Patient's goals: "Be with leg strength, back to riding bikes; walking better if I can".      Objective     8/24/20   Palpations: Mod TTP at L thigh s/p Sx, scar stiffness   5x Sit to Stand: 9 seconds no new c/o.  Timed Up and Go: 7 seconds with antalgic and unsteady gait.     8/10 FOTO:          TREATMENT      Tito received therapeutic exercises to develop strength, endurance, ROM, flexibility, posture and core " "stabilization for 40 minutes including:    Upright bike 8 min Manual L 2  GSS on wedge 3x 30"  HS stretch 3x 20"   SLR supine 2 x15   LAQ  2x15  4#  BLFO x20 B with GTB  Supine Bridges [id number]   Clams x20 with GTB  Mini squats 20x  B Heel and Toe raises 30x   Lat step up 2 in 20x    +Standing HS curls B with 4# ankle weight 2x 15 with range as tolerated  SLS (unsupported)  going for max stance time each side (untimed) 3 minutes clock  Alternating Marching with 4# ankle weights B 2x 10  (with 1 UE support)        Manual therapy: 5 minutes  Scar tissue mob  CFM prox Sx site    Home Exercises and Patient Education Provided  Education provided:  Progressed to 2x 15 reps (2x/day) with Previous HEP.    Written Home Exercises Provided: Not today.  Exercises were reviewed and Tito was able to demonstrate them prior to the end of the session.  Tito demonstrated good  understanding of the education provided.      See EMR under Patient Instructions for exercises provided 8/10/2020.     Assessment     Patient was instructed to progress HEP with 2x 15 reps (2x/day). PT provided manual therapy today to soft tissue prox to Sx site; revealed significant tightness. Minimal discomfort was reported. Patient was instructed to continue  self mob at home with lotion, to tissue around sx site. PT attempted to progress HEP with resistance band, but patient did not tolerate due to Knee pain; resistance is better with ankle weights.     Patient will benefit from skilled outpatient Physical Therapy to address the deficits stated above and in the chart below, provide patient /family education, and to maximize patientt's level of independence.   Patient's spiritual, cultural and educational needs considered and patient is agreeable to the plan of care and goals as stated below:      Anticipated Barriers for therapy: None          GOALS:   STG 6 Weeks:  1.Patientt able to perform HEP correctly with minimal cueing or supervision from therapist " to encourage independent management of symptoms.  2. Patient will ambulate for 1 mile, w/o increasing pain and with improving gait mechanics,  to demonstrate increasing ambulation tolerance w/ decreasing difficulty.  3. Patient will display alternating and reciprocal stair climbing (up<>down) unsupported w/o increasing pain by more than 1 point.     LTG 12 Weeks:  1. Pt to demonstrate ability to independently control and reduce their pain through posture positioning and mechanical movements throughout a typical day.  2. Patient will improve 5x STS test for functional legs strength to 7 seconds or less, w/o increasing pain, to demonstrate improved functional legs strength.  3. Patient will ride bicycle for 2-3 miles w/o increasing pain, to demonstrate increasing biking tolerance w/o limitation to return to PL of activity.  4. Patient will demonstrate improved overall function per FOTO Survey to <20% impaired, limited or restricted score or less in order to show subjective improvement of condition.  5. Pt will demonstrate independence with the HEP at discharge  6. Patient will demonstrate 120 deg L knee flexion         Plan   Plan of care Certification: 8/10/2020 to 11/2/20.    Continue with current POC for further strengthening, flexibility, improve ROM and ADL performance. Will progress functional/work related there-ex as tolerated.        Outpatient Physical Therapy 2 times weekly for 12 weeks to include the following interventions: Electrical Stimulation as needed, Gait Training, Manual Therapy, Moist Heat/ Ice, Neuromuscular Re-ed, Patient Education, Self Care, Therapeutic Activites, Therapeutic Exercise and dry needling as needed. .

## 2020-09-02 ENCOUNTER — CLINICAL SUPPORT (OUTPATIENT)
Dept: REHABILITATION | Facility: OTHER | Age: 50
End: 2020-09-02
Payer: COMMERCIAL

## 2020-09-02 DIAGNOSIS — R29.898 WEAKNESS OF BOTH LOWER EXTREMITIES: ICD-10-CM

## 2020-09-02 DIAGNOSIS — Z74.09 IMPAIRED FUNCTIONAL MOBILITY, BALANCE, GAIT, AND ENDURANCE: ICD-10-CM

## 2020-09-02 DIAGNOSIS — R68.89 DECREASED FUNCTIONAL ACTIVITY TOLERANCE: ICD-10-CM

## 2020-09-02 DIAGNOSIS — M25.662 DECREASED RANGE OF MOTION (ROM) OF LEFT KNEE: ICD-10-CM

## 2020-09-02 PROCEDURE — 97110 THERAPEUTIC EXERCISES: CPT | Mod: PN

## 2020-09-02 NOTE — PROGRESS NOTES
"  [facility]  Rx note     Date: 8/10/2020   Name: Tito Stanley  Clinic Number: [id number]     Therapy Diagnosis:        Encounter Diagnoses   Name Primary?   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    Work with quad, adductor, hamstring strengthening and gait training.     Evaluation Date: 8/10/2020  Authorization Period Expiration: 12/31/20  Plan of Care Expiration: 11/02/20 (12 weeks)  Re-assessment: 9/10/20  Visit # / Visits authorized: 8/ 20     Time In: 1100  Time Out: 1200  Total Appointment Time (timed & untimed codes):60 minutes     Precautions: Recent Hx of Skin cancer with post Op.  L menisectomy few years ago with no PT     Subjective     Patient reports doing well today with B Knee pain of 2/10 currently. He has no new c/o. "Everyting is getting better".        Pain:  Current 2/10, worst 6/10, best 0/10 (B Knees)  Location: right and left Knees (L>R)   Aggravating Factors: Walking, Getting out of bed/chair and standing  Easing Factors: pain medication and rest     Patient's goals: "Be with leg strength, back to riding bikes; walking better if I can".      Objective     8/24/20   Palpations: Mod TTP at L thigh s/p Sx, scar stiffness   5x Sit to Stand: 9 seconds no new c/o.  Timed Up and Go: 7 seconds with antalgic and unsteady gait.     8/10 FOTO:          TREATMENT      Tito received therapeutic exercises to develop strength, endurance, ROM, flexibility, posture and core stabilization for 60 " "minutes including:    Upright bike 8 min [place]/sprints L 2.5    GSS on wedge 3x 30" B  HS stretch 3x 30" B   +Shuttle at 50# DL x15; 75# DL x15; 25# L LE x15  +Staggered sit to stand from standard chair x10 each side  SLR supine 2 x15 with 4# ankle weight  LAQ  2x15  4# B  +Standing Hip Abd with 4# ankle weight 2x 15 B  +Standing Hip Ext to tolerable range 2x 15 with 4# ankle weights B  BLFO x20 B with GTB DNC today  Supine Bridges 2x 15   Clams x20 with GTB DNC today`  Mini squats 20x DNC today  B Heel and Toe raises 30x   Lat step up 2 in 20x  DNC today  +Standing HS curls  with 4# ankle weight 2x 15 with range as tolerated B  SLS (unsupported)  going for max stance time each side (untimed) 3 minutes clock DNC today  Alternating supine Marching (starting from LE's extended) with 4# ankle weights  x20     Manual therapy: 0minutes  Scar tissue mob  CFM prox Sx site    Home Exercises and Patient Education Provided  Education provided:  Progressed to 2x 15 reps (2x/day) with Previous HEP.    Written Home Exercises Provided: Not today.  Exercises were reviewed and Tito was able to demonstrate them prior to the end of the session.  Tito demonstrated good  understanding of the education provided.      See EMR under Patient Instructions for exercises provided 8/10/2020.     Assessment     Patient continued to progress with therex, with increasing resistance with LE strengthening in OCK and CKC. The staggered sit to stand increased discomfort in B knees. Continue with today's treatment at next visit. Progress bike to 10 minutes, with same parameters.     Patient will benefit from skilled outpatient Physical Therapy to address the deficits stated above and in the chart below, provide patient /family education, and to maximize patientt's level of independence.   Patient's spiritual, cultural and educational needs considered and patient is agreeable to the plan of care and goals as stated below:      Anticipated Barriers " for therapy: None          GOALS:   STG 6 Weeks:  1.Patientt able to perform HEP correctly with minimal cueing or supervision from therapist to encourage independent management of symptoms.  2. Patient will ambulate for 1 mile, w/o increasing pain and with improving gait mechanics,  to demonstrate increasing ambulation tolerance w/ decreasing difficulty.  3. Patient will display alternating and reciprocal stair climbing (up<>down) unsupported w/o increasing pain by more than 1 point.     LTG 12 Weeks:  1. Pt to demonstrate ability to independently control and reduce their pain through posture positioning and mechanical movements throughout a typical day.  2. Patient will improve 5x STS test for functional legs strength to 7 seconds or less, w/o increasing pain, to demonstrate improved functional legs strength.  3. Patient will ride bicycle for 2-3 miles w/o increasing pain, to demonstrate increasing biking tolerance w/o limitation to return to PL of activity.  4. Patient will demonstrate improved overall function per FOTO Survey to <20% impaired, limited or restricted score or less in order to show subjective improvement of condition.  5. Pt will demonstrate independence with the HEP at discharge  6. Patient will demonstrate 120 deg L knee flexion         Plan   Plan of care Certification: 8/10/2020 to 11/2/20.    Continue with current POC for further strengthening, flexibility, improve ROM and ADL performance. Will progress functional/work related there-ex as tolerated.        Outpatient Physical Therapy 2 times weekly for 12 weeks to include the following interventions: Electrical Stimulation as needed, Gait Training, Manual Therapy, Moist Heat/ Ice, Neuromuscular Re-ed, Patient Education, Self Care, Therapeutic Activites, Therapeutic Exercise and dry needling as needed. .

## 2020-09-09 ENCOUNTER — CLINICAL SUPPORT (OUTPATIENT)
Dept: REHABILITATION | Facility: OTHER | Age: 50
End: 2020-09-09
Payer: COMMERCIAL

## 2020-09-09 DIAGNOSIS — R29.898 WEAKNESS OF BOTH LOWER EXTREMITIES: ICD-10-CM

## 2020-09-09 DIAGNOSIS — R68.89 DECREASED FUNCTIONAL ACTIVITY TOLERANCE: ICD-10-CM

## 2020-09-09 DIAGNOSIS — Z74.09 IMPAIRED FUNCTIONAL MOBILITY, BALANCE, GAIT, AND ENDURANCE: ICD-10-CM

## 2020-09-09 DIAGNOSIS — M25.662 DECREASED RANGE OF MOTION (ROM) OF LEFT KNEE: ICD-10-CM

## 2020-09-09 PROCEDURE — 97110 THERAPEUTIC EXERCISES: CPT | Mod: PN

## 2020-09-09 NOTE — PROGRESS NOTES
" [facility]  Re-assessment     Date: 8/10/2020   Name: Tito Stanley  Clinic Number: [id number]     Therapy Diagnosis:        Encounter Diagnoses   Name Primary?   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    Work with quad, adductor, hamstring strengthening and gait training.     Evaluation Date: 8/10/2020  Authorization Period Expiration: 12/31/20  Plan of Care Expiration: 11/02/20 (12 weeks)  Re-assessment: Completed 9/9/20  Visit # / Visits authorized: 10/ 20     Time In: 0945  Time Out: 1130  Total Appointment Time (timed & untimed codes) 45 minutes     Precautions: Recent Hx of Skin cancer with post Op.  L menisectomy few years ago with no PT     Subjective     Tito has no new c/o and denies B knee pain lately. He has been using the airbike at home. He reports all is getting better. He reports the LE's HEP help a lot and decrease his knee stiffness. He feels that he is at 90% back to PLOF "normal" with daily activities.        Pain:  Current 0/10, worst 1-2/10, best 0/10 (B Knees)  Location: right and left Knees (L>R)   Aggravating Factors: Walking, Getting out of bed/chair and standing  Easing Factors: pain medication and rest     Patient's goals: "Be with leg strength, back to riding bikes; walking better if I can".      Objective     9/9/20  Palpations: Mod TTP at L thigh s/p Sx, scar stiffness  5x Sit to Stand: 9 seconds no new c/o (Eval)==> 7 seconds w/o new c/o  Timed Up and Go: 7 seconds with " "antalgic and unsteady gait (Eval)==>5 seconds with very stable gait     9/9/20   LE AROM RIGHT LEFT   Knee flexion 120==>120 115 ERP==>120 no pain   Knee extension 5==>0 2==>0     9/9/20   Lower Extremity Strength  Right LE   Left LE     Hip flexion: 5/5 Hip flexion: 4+/5   Hip extension:  5/5 Hip extension: 5/5   Hip abduction: 5/5 Hip abduction: 5/5   Hip adduction:  5/5 Hip adduction:  5/5   Hip Internal rotation    5/5 Hip Internal rotation 5/5   Knee Flexion 5/5 Knee Flexion 5/5   Knee Extension 5/5 Knee Extension 5/5   Ankle dorsiflexion: 5/5 Ankle dorsiflexion: 5/5   Ankle plantarflexion: 5/5 Ankle plantarflexion: 5/5          TREATMENT      [place] received therapeutic exercises to develop strength, endurance, ROM, flexibility, posture and core stabilization for 45 minutes including:    Upright bike 6 min [place]/sprints L 3    GSS on wedge 3x 30" B  HS stretch 3x 30" B   +Shuttle at 75# DL 3x1 5; 75# DL x15; 25# L LE x15  +Staggered sit to stand from standard chair x15 each side  Step down from 6" step with heel touch (and as needed 1 UE assist) x10 each side===> feels very weak and uncomfortables      SLR supine 2 x15 with 4# ankle weight  LAQ  2x15  4# B  +Standing Hip Abd with 4# ankle weight 2x 15 B  +Standing Hip Ext to tolerable range 2x 15 with 4# ankle weights B  BLFO x20 B with GTB DNC today  Supine Bridges 2x 15   Clams x20 with GTB DNC today`  Mini squats 20x DNC today  B Heel and Toe raises 30x   Lat step up 2 in 20x  DNC today  +Standing HS curls  with 4# ankle weight 2x 15 with range as tolerated B  SLS (unsupported)  going for max stance time each side (untimed) 3 minutes clock DNC today  Alternating supine Marching (starting from LE's extended) with 4# ankle weights  x20     Manual therapy: 0 minutes  Scar tissue mob  CFM prox Sx site    Home Exercises and Patient Education Provided  Education provided:  Progressed to 2x 15 reps (2x/day) with Previous HEP.    Written Home Exercises Provided: Not " today.  Exercises were reviewed and Tito was able to demonstrate them prior to the end of the session.  Tito demonstrated good  understanding of the education provided.      See EMR under Patient Instructions for exercises provided 8/10/2020.     Assessment     Patient made significant progress with functional mobility, functional activities tolerance, B LEs strength and Knee ROM. FOTO improve to 15% limitations, from 44%. He continues to feel discomfort and unstable with descending stairs; ascending is fine with alt and reciprocal.     Patient will benefit from skilled outpatient Physical Therapy to address the deficits stated above and in the chart below, provide patient /family education, and to maximize patientt's level of independence.   Patient's spiritual, cultural and educational needs considered and patient is agreeable to the plan of care and goals as stated below:      Anticipated Barriers for therapy: None          GOALS:   STG 6 Weeks:  1.Patientt able to perform HEP correctly with minimal cueing or supervision from therapist to encourage independent management of symptoms. (MET 9/9/20)  2. Patient will ambulate for 1 mile, w/o increasing pain and with improving gait mechanics,  to demonstrate increasing ambulation tolerance w/ decreasing difficulty. ( Has not tried yet, will attempt to complete before next visit; progressing)  3. Patient will display alternating and reciprocal stair climbing (up<>down) unsupported w/o increasing pain by more than 1 point. (Able to go up, but coming down feels unstable)     LTG 12 Weeks:   1. Pt to demonstrate ability to independently control and reduce their pain through posture positioning and mechanical movements throughout a typical day.(MET 9/9/20)  2. Patient will improve 5x STS test for functional legs strength to 7 seconds or less, w/o increasing pain, to demonstrate improved functional legs strength. (MET 9/9/20)  3. Patient will ride bicycle for 2-3 miles  w/o increasing pain, to demonstrate increasing biking tolerance w/o limitation to return to PL of activity. (Progressing; will attempt)  4. Patient will demonstrate improved overall function per FOTO Survey to <20% impaired, limited or restricted score or less in order to show subjective improvement of condition. (MET, continue to progress)  5. Pt will demonstrate independence with the HEP at discharge (Progressing well)  6. Patient will demonstrate 120 deg L knee flexion (MET 9/9/20)         Plan   Plan of care Certification: 8/10/2020 to 11/2/20.    Focus on Dynamic single leg balance, increasing Knee stability with CKC exercises, and endurance with biking tolerance.          Jessica Pinto, KEILAT

## 2020-09-17 ENCOUNTER — CLINICAL SUPPORT (OUTPATIENT)
Dept: REHABILITATION | Facility: OTHER | Age: 50
End: 2020-09-17
Payer: COMMERCIAL

## 2020-09-17 DIAGNOSIS — Z74.09 IMPAIRED FUNCTIONAL MOBILITY, BALANCE, GAIT, AND ENDURANCE: ICD-10-CM

## 2020-09-17 DIAGNOSIS — R29.898 WEAKNESS OF BOTH LOWER EXTREMITIES: ICD-10-CM

## 2020-09-17 DIAGNOSIS — R68.89 DECREASED FUNCTIONAL ACTIVITY TOLERANCE: ICD-10-CM

## 2020-09-17 DIAGNOSIS — M25.662 DECREASED RANGE OF MOTION (ROM) OF LEFT KNEE: ICD-10-CM

## 2020-09-17 PROCEDURE — 97110 THERAPEUTIC EXERCISES: CPT | Mod: PN,CQ

## 2020-09-17 PROCEDURE — 97116 GAIT TRAINING THERAPY: CPT | Mod: PN,CQ

## 2020-09-17 NOTE — PROGRESS NOTES
"  [facility]  Re-assessment     Date: 8/10/2020   Name: Tito Stanley  Clinic Number: [id number]     Therapy Diagnosis:        Encounter Diagnoses   Name Primary?   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    Work with quad, adductor, hamstring strengthening and gait training.     Evaluation Date: 8/10/2020  Authorization Period Expiration: 12/31/20  Plan of Care Expiration: 11/02/20 (12 weeks)  Re-assessment: Completed 9/9/20  Visit # / Visits authorized: 11/ 20     Time In: 0900  Time Out: 0945  Total Appointment Time (timed & untimed codes)  45 minutes     Precautions: Recent Hx of Skin cancer with post Op.  L menisectomy few years ago with no PT     Subjective     Pt reports w/ no c.o pn in either knee.    Pain:  Current 0/10, worst 1-2/10, best 0/10 (B Knees)  Location: right and left Knees (L>R)   Aggravating Factors: Walking, Getting out of bed/chair and standing  Easing Factors: pain medication and rest     Patient's goals: "Be with leg strength, back to riding bikes; walking better if I can".      Objective   Pt entered clinic with waddling gait pattern.      TREATMENT      Tito received therapeutic exercises to develop strength, endurance, ROM, flexibility, posture and core stabilization for 37 minutes including:    Upright bike 5 min [place]/sprints L 3  GSS on wedge 3x 30" B  HS stretch 3x 30" B   +Shuttle at 75# DL 3x1 5; 75# DL x15; 25# L LE x15  Shuttle SL press w/ ecc lowering 3 x 10 ea " "  +Staggered sit to stand from standard chair 2 x 10  Seated march with TA activation 3 x 30 sec   Bridges 30 x   Prone quad stretch w/ strap 2 min ea     Step down from 6" step with heel touch (and as needed 1 UE assist) x10 each side===> feels very weak and uncomfortables  SLR supine 2 x15 with 4# ankle weight  LAQ  2x15  4# B  +Standing Hip Abd with 4# ankle weight 2x 15 B  +Standing Hip Ext to tolerable range 2x 15 with 4# ankle weights B  BLFO x20 B with GTB DNC today  Supine Bridges 2x 15   Clams x20 with GTB DNC today`  Mini squats 20x DNC today  B Heel and Toe raises 30x   Lat step up 2 in 20x  DNC today  +Standing HS curls  with 4# ankle weight 2x 15 with range as tolerated B  SLS (unsupported)  going for max stance time each side (untimed) 3 minutes clock DNC today  Alternating supine Marching (starting from LE's extended) with 4# ankle weights  x20     Manual therapy: 0 minutes  Scar tissue mob  CFM prox Sx site    Gait Trainin min   2 cone hesitation walk in // 40 x   Lateral step 40 ft x 2     Home Exercises and Patient Education Provided  Pt edu on proper exercise technique.      Written Home Exercises Provided: Not today.  Exercises were reviewed and Tito was able to demonstrate them prior to the end of the session.  Tito demonstrated good  understanding of the education provided.      See EMR under Patient Instructions for exercises provided 8/10/2020.     Assessment     Pt showed increased quad strength and improved gait mechanics during tx today.  Pt luis f tx well w/ no c/o pn.  Pt cont to lack core stability and LE strength.      ]  Patient's spiritual, cultural and educational needs considered and patient is agreeable to the plan of care and goals as stated below:      Anticipated Barriers for therapy: None          GOALS:   STG 6 Weeks:  1.Patientt able to perform HEP correctly with minimal cueing or supervision from therapist to encourage independent management of symptoms. (MET 20)  2. " Patient will ambulate for 1 mile, w/o increasing pain and with improving gait mechanics,  to demonstrate increasing ambulation tolerance w/ decreasing difficulty. ( Has not tried yet, will attempt to complete before next visit; progressing)  3. Patient will display alternating and reciprocal stair climbing (up<>down) unsupported w/o increasing pain by more than 1 point. (Able to go up, but coming down feels unstable)     LTG 12 Weeks:   1. Pt to demonstrate ability to independently control and reduce their pain through posture positioning and mechanical movements throughout a typical day.(MET 9/9/20)  2. Patient will improve 5x STS test for functional legs strength to 7 seconds or less, w/o increasing pain, to demonstrate improved functional legs strength. (MET 9/9/20)  3. Patient will ride bicycle for 2-3 miles w/o increasing pain, to demonstrate increasing biking tolerance w/o limitation to return to PL of activity. (Progressing; will attempt)  4. Patient will demonstrate improved overall function per FOTO Survey to <20% impaired, limited or restricted score or less in order to show subjective improvement of condition. (MET, continue to progress)  5. Pt will demonstrate independence with the HEP at discharge (Progressing well)  6. Patient will demonstrate 120 deg L knee flexion (MET 9/9/20)         Plan   Cont to progress towards goals set by PT.  Work to increase core stability and quad strength next visit.        Molina Hirsch, PTA

## 2020-09-18 ENCOUNTER — OFFICE VISIT (OUTPATIENT)
Dept: ORTHOPEDICS | Facility: CLINIC | Age: 50
End: 2020-09-18
Payer: COMMERCIAL

## 2020-09-18 VITALS — HEIGHT: 71 IN | WEIGHT: 309.06 LBS | BODY MASS INDEX: 43.27 KG/M2

## 2020-09-18 DIAGNOSIS — C49.22 SARCOMA OF THIGH, LEFT: Primary | ICD-10-CM

## 2020-09-18 PROCEDURE — 99024 POSTOP FOLLOW-UP VISIT: CPT | Mod: S$GLB,,, | Performed by: ORTHOPAEDIC SURGERY

## 2020-09-18 PROCEDURE — 99999 PR PBB SHADOW E&M-EST. PATIENT-LVL III: CPT | Mod: PBBFAC,,, | Performed by: ORTHOPAEDIC SURGERY

## 2020-09-18 PROCEDURE — 99024 PR POST-OP FOLLOW-UP VISIT: ICD-10-PCS | Mod: S$GLB,,, | Performed by: ORTHOPAEDIC SURGERY

## 2020-09-18 PROCEDURE — 99999 PR PBB SHADOW E&M-EST. PATIENT-LVL III: ICD-10-PCS | Mod: PBBFAC,,, | Performed by: ORTHOPAEDIC SURGERY

## 2020-09-18 NOTE — LETTER
September 18, 2020    Tito Stanley  [address] [facility] [address]         [facility] - Ortho 3rd Fl  [address] [facility] [place], [address]RD FLO[address]  [facility] [address]  Phone: [phone]  Fax: [phone] September 18, 2020     Patient: Tito Stanley   YOB: 1970   Date of Visit: 9/18/2020       To Whom It May Concern:    It is my medical opinion that Tito Stanley  May return to work Monday Sept.21. If     I can be of any help tp you, please don't hesitate to call     Sincerely,        Yusef Lang MD

## 2020-09-21 ENCOUNTER — DOCUMENTATION ONLY (OUTPATIENT)
Dept: REHABILITATION | Facility: OTHER | Age: 50
End: 2020-09-21

## 2020-09-21 ENCOUNTER — TELEPHONE (OUTPATIENT)
Dept: ORTHOPEDICS | Facility: CLINIC | Age: 50
End: 2020-09-21

## 2020-09-21 NOTE — TELEPHONE ENCOUNTER
----- Message from Rene Velarde sent at 9/21/2020  8:31 AM CDT -----  Contact: ELIJAH Gomez  Pt wk needs to know if he has any restriction to returning back to work it wasn't listed on his return to work letter   Pt job said letter can say if pt has any restriction or not     PLEASE CALL    Contact  [phone]   Fax  [phone]      Spoke with patient  He has no restrictions  I did a new letter & faxed it

## 2020-09-21 NOTE — PROGRESS NOTES
Subjective:     HPI:   Tito Stanley is a 49 y.o. 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  DOS 7/24/20  Third post-op visit  No pain, not taking any pain meds   Incision well healed with no drainage  Eager to return to work      Objective:   Exam:  Surgical scar extends from inner groin to the adductor tubercle region of the femur left inner thigh  1 nylon suture still in place- removed  No seroma  SILT LFC nerve and femoral nerve, some mild decreased sensation along inner thigh.  Full knee ROM  Motor intact throughout with 5/5 strength  Foot WWP    Imaging:  None today      Assessment:     Sarcoma of thigh, left [C49.22]  2 months postop, wide resection with confirmed negative margins.  Well differentiated liposarcoma  T4, N0, M0, G1,m Tumor Stage IB, low grade and large size.     Plan:       F/u 1/2021 with surveillance scans prior to appointment  (CT non-con chest,  MRI left femur  w & w/o contrast)  BMP ordered for 12/2020,  prior to contrast scan          Orders Placed This Encounter   Procedures    MRI Femur W WO Contrast Left     Standing Status:   Future     Standing Expiration Date:   9/18/2021     Order Specific Question:   Does the patient have a pacemaker or a defibrilator?     Answer:   No     Order Specific Question:   Does the patient have a cerebral aneurysm or surgical clip, pump, nerve or brain stimulator, middle or inner ear prosthesis, or other metal implant or  been injured by a metal object(i.e. bullet, bb, shrapnel)?     Answer:   No     Order Specific Question:   Is the patient claustrophobic?     Answer:   No     Order Specific Question:   Will the patient require sedation?     Answer:   No     Order Specific Question:   Does the patient have any of the following conditions? Diabetes, History of Renal Disease or Hypertension requiring medical therapy?     Answer:   No     Order Specific Question:   May the Radiologist modify the order  per protocol to meet the clinical needs of the patient?     Answer:   Yes     Order Specific Question:   Is this part of a Research Study?     Answer:   No     Order Specific Question:   Recist criteria?     Answer:   Yes     Order Specific Question:   Will this service be billed to a Worker's Comp policy?     Answer:   No     Order Specific Question:   Does the patient have on a skin patch for medication with aluminized backing?     Answer:   No    CT Chest Without Contrast     Standing Status:   Future     Standing Expiration Date:   9/18/2021     Order Specific Question:   May the Radiologist modify the order per protocol to meet the clinical needs of the patient?     Answer:   Yes     Order Specific Question:   Access port per protocol?     Answer:   No    Basic metabolic panel     Standing Status:   Future     Standing Expiration Date:   11/17/2021       Past Medical History:   Diagnosis Date    Hypertension        Past Surgical History:   Procedure Laterality Date    HERNIA REPAIR      SURGICAL REMOVAL OF SARCOMA OF LOWER EXTREMITY Left 7/24/2020    Procedure: EXCISION, SARCOMA, LOWER EXTREMITY;  Surgeon: Yusef Lang MD;  Location: [facility] OR [address] [place] [address];  Service: Orthopedics;  Laterality: Left;  radical resection left thigh sarcoma       No family history on file.    Social History     Socioeconomic History    Marital status: Single     Spouse name: Not on file    Number of children: Not on file    Years of education: Not on file    Highest education level: Not on file   Occupational History    Not on file   Social Needs    Financial resource strain: Not on file    Food insecurity     Worry: Not on file     Inability: Not on file    Transportation needs     Medical: Not on file     Non-medical: Not on file   Tobacco Use    Smoking status: Never Smoker    Smokeless tobacco: Never Used   Substance and Sexual Activity    Alcohol use: Yes     Comment: 1-2 week    Drug use: No    Sexual activity:  Not on file   Lifestyle    Physical activity     Days per week: Not on file     Minutes per session: Not on file    Stress: Not on file   Relationships    Social connections     Talks on phone: Not on file     Gets together: Not on file     Attends Zoroastrianism service: Not on file     Active member of club or organization: Not on file     Attends meetings of clubs or organizations: Not on file     Relationship status: Not on file   Other Topics Concern    Not on file   Social History Narrative    Not on file

## 2020-09-21 NOTE — PROGRESS NOTES
Patient was scheduled for a follow up physical therapy appointment at [facility] location on 9/21/2020. Patient failed to appear for the appointment without prior notification today. Spoke with pt at approximately 1002 concerning missed PT appointment. Pt stated she was at his MD participating in a physical for return to work. Stated he would call this clinic ASAP concerning if he is to continue PT per his MD.      Kristopher Palma, PTA  9/21/2020

## 2020-09-23 ENCOUNTER — DOCUMENTATION ONLY (OUTPATIENT)
Dept: REHABILITATION | Facility: OTHER | Age: 50
End: 2020-09-23

## 2020-09-23 NOTE — PROGRESS NOTES
Patient was scheduled for a follow up physical therapy appointment at [facility] \[facility]\"" location on 9/23/2020. Patient failed to appear for the appointment without prior notification today. Spoke with pt via telecommunicating, regarding missed PT appointment. Pt stated that he is back at work. And needs to change his appointments to afternoons. PAR for this clinic was notified and contacted pt to reschedule.       Kristopher Palma, PTA   9/23/2020

## 2020-12-17 ENCOUNTER — TELEPHONE (OUTPATIENT)
Dept: ORTHOPEDICS | Facility: CLINIC | Age: 50
End: 2020-12-17

## 2020-12-29 ENCOUNTER — HOSPITAL ENCOUNTER (OUTPATIENT)
Dept: RADIOLOGY | Facility: HOSPITAL | Age: 50
Discharge: HOME | End: 2020-12-29
Attending: ORTHOPAEDIC SURGERY
Payer: COMMERCIAL

## 2020-12-29 DIAGNOSIS — C49.22 SARCOMA OF THIGH, LEFT: ICD-10-CM

## 2020-12-29 PROCEDURE — 73718 MRI FEMUR WITHOUT CONTRAST LEFT: ICD-10-PCS | Mod: 26,LT,, | Performed by: RADIOLOGY

## 2020-12-29 PROCEDURE — 73718 MRI LOWER EXTREMITY W/O DYE: CPT | Mod: TC,LT

## 2020-12-29 PROCEDURE — 71250 CT THORAX DX C-: CPT | Mod: 26,,, | Performed by: RADIOLOGY

## 2020-12-29 PROCEDURE — 71250 CT THORAX DX C-: CPT | Mod: TC

## 2020-12-29 PROCEDURE — 71250 CT CHEST WITHOUT CONTRAST: ICD-10-PCS | Mod: 26,,, | Performed by: RADIOLOGY

## 2020-12-29 PROCEDURE — 73718 MRI LOWER EXTREMITY W/O DYE: CPT | Mod: 26,LT,, | Performed by: RADIOLOGY

## 2021-01-15 ENCOUNTER — OFFICE VISIT (OUTPATIENT)
Dept: ORTHOPEDICS | Facility: CLINIC | Age: 51
End: 2021-01-15
Payer: COMMERCIAL

## 2021-01-15 VITALS — BODY MASS INDEX: 42 KG/M2 | WEIGHT: 300 LBS | HEIGHT: 71 IN

## 2021-01-15 DIAGNOSIS — C49.22 SARCOMA OF THIGH, LEFT: Primary | ICD-10-CM

## 2021-01-15 PROCEDURE — 99999 PR PBB SHADOW E&M-EST. PATIENT-LVL III: CPT | Mod: PBBFAC,,, | Performed by: ORTHOPAEDIC SURGERY

## 2021-01-15 PROCEDURE — 99999 PR PBB SHADOW E&M-EST. PATIENT-LVL III: ICD-10-PCS | Mod: PBBFAC,,, | Performed by: ORTHOPAEDIC SURGERY

## 2021-01-15 PROCEDURE — 99213 PR OFFICE/OUTPT VISIT, EST, LEVL III, 20-29 MIN: ICD-10-PCS | Mod: S$GLB,,, | Performed by: ORTHOPAEDIC SURGERY

## 2021-01-15 PROCEDURE — 99213 OFFICE O/P EST LOW 20 MIN: CPT | Mod: S$GLB,,, | Performed by: ORTHOPAEDIC SURGERY

## 2021-08-16 ENCOUNTER — TELEPHONE (OUTPATIENT)
Dept: ORTHOPEDICS | Facility: CLINIC | Age: 51
End: 2021-08-16

## 2021-08-18 NOTE — TRANSFER OF CARE
"Anesthesia Transfer of Care Note    Patient: Tito Stanley    Procedure(s) Performed: Procedure(s) (LRB):  EXCISION, SARCOMA, LOWER EXTREMITY (Left)    Patient location: PACU    Anesthesia Type: general    Transport from OR: Transported from OR on 6-10 L/min O2 by face mask with adequate spontaneous ventilation    Post pain: adequate analgesia    Post assessment: no apparent anesthetic complications and tolerated procedure well    Post vital signs: stable    Level of consciousness: responds to stimulation and sedated    Nausea/Vomiting: no nausea/vomiting    Complications: none    Transfer of care protocol was followed      Last vitals:   Visit Vitals  BP (!) 142/66 (BP Location: Left arm, Patient Position: Lying)   Pulse 78   Temp 36.9 °C (98.5 °F) (Oral)   Resp 17   Ht 5' 11" (1.803 m)   Wt (!) 138.3 kg (305 lb)   SpO2 100%   BMI 42.54 kg/m²     " Evaluate with Colonoscopy, Sutab split prep

## 2021-08-23 ENCOUNTER — TELEPHONE (OUTPATIENT)
Dept: ORTHOPEDICS | Facility: CLINIC | Age: 51
End: 2021-08-23

## 2021-08-23 DIAGNOSIS — C49.9 MALIGNANT NEOPLASM OF CONNECTIVE AND SOFT TISSUE, UNSPECIFIED: Primary | ICD-10-CM

## 2021-08-23 DIAGNOSIS — C49.9 MALIGNANT NEOPLASM OF CONNECTIVE AND SOFT TISSUE, UNSPECIFIED: ICD-10-CM

## 2021-10-14 ENCOUNTER — TELEPHONE (OUTPATIENT)
Dept: ORTHOPEDICS | Facility: CLINIC | Age: 51
End: 2021-10-14

## 2021-10-25 ENCOUNTER — TELEPHONE (OUTPATIENT)
Dept: ORTHOPEDICS | Facility: CLINIC | Age: 51
End: 2021-10-25
Payer: COMMERCIAL

## 2021-11-04 ENCOUNTER — TELEPHONE (OUTPATIENT)
Dept: ORTHOPEDICS | Facility: CLINIC | Age: 51
End: 2021-11-04
Payer: COMMERCIAL

## 2021-11-04 DIAGNOSIS — C49.9 MALIGNANT NEOPLASM OF CONNECTIVE AND SOFT TISSUE, UNSPECIFIED: Primary | ICD-10-CM

## 2021-12-09 ENCOUNTER — TELEPHONE (OUTPATIENT)
Dept: ORTHOPEDICS | Facility: CLINIC | Age: 51
End: 2021-12-09
Payer: COMMERCIAL

## 2022-01-01 NOTE — NURSING TRANSFER
Nursing Transfer Note      7/24/2020     Transfer to room 526 from PACU    Transfer via stretcher    Transfer with personal belongings, Prevena , Wound Vac    Transported by PACU PCT    Medicines sent:     Chart send with patient: Yes    Notified: significant other           [Alert] : alert [Acute Distress] : no acute distress [Normocephalic] : normocephalic [Flat Open Anterior [place]] : flat open anterior fontanelle [Icteric sclera] : nonicteric sclera [PERRL] : PERRL [Red Reflex Bilateral] : red reflex bilateral [Normally Placed Ears] : normally placed ears [Auricles Well Formed] : auricles well formed [Clear Tympanic membranes] : clear tympanic membranes [Light reflex present] : light reflex present [Bony structures visible] : bony structures visible [Patent Auditory Canal] : patent auditory canal [Discharge] : no discharge [Nares Patent] : nares patent [Palate Intact] : palate intact [Uvula Midline] : uvula midline [Supple, full passive range of motion] : supple, full passive range of motion [Palpable Masses] : no palpable masses [Symmetric Chest Rise] : symmetric chest rise [Clear to Auscultation Bilaterally] : clear to auscultation bilaterally [Regular Rate and Rhythm] : regular rate and rhythm [S1, S2 present] : S1, S2 present [Murmurs] : no murmurs [+2 Femoral Pulses] : +2 femoral pulses [Soft] : soft [Tender] : nontender [Distended] : not distended [Bowel Sounds] : bowel sounds present [Umbilical Stump Dry, Clean, Intact] : umbilical stump dry, clean, intact [Hepatomegaly] : no hepatomegaly [Splenomegaly] : no splenomegaly [Normal external genitalia] : normal external genitalia [Clitoromegaly] : no clitoromegaly [Patent Vagina] : patent vagina [Patent] : patent [Normally Placed] : normally placed [No Abnormal Lymph Nodes Palpated] : no abnormal lymph nodes palpated [Kahn-Ortolani] : negative Kahn-Ortolani [Symmetric Flexed Extremities] : symmetric flexed extremities [Spinal Dimple] : no spinal dimple [Tuft of Hair] : no tuft of hair [Startle Reflex] : startle reflex present [Suck Reflex] : suck reflex present [Rooting] : rooting reflex present [Palmar Grasp] : palmar grasp present [Plantar Grasp] : plantar reflex present [Symmetric [place]] : symmetric [place] [Jaundice] : not jaundice

## 2022-03-22 ENCOUNTER — HOSPITAL ENCOUNTER (EMERGENCY)
Facility: HOSPITAL | Age: 52
Discharge: LEFT BEFORE DISCHARGE | End: 2022-03-22
Attending: EMERGENCY MEDICINE
Payer: COMMERCIAL

## 2022-03-22 VITALS
SYSTOLIC BLOOD PRESSURE: 187 MMHG | WEIGHT: 295 LBS | RESPIRATION RATE: 18 BRPM | OXYGEN SATURATION: 99 % | HEIGHT: 71 IN | BODY MASS INDEX: 41.3 KG/M2 | HEART RATE: 75 BPM | DIASTOLIC BLOOD PRESSURE: 82 MMHG | TEMPERATURE: 99 F

## 2022-03-22 DIAGNOSIS — I10 UNCONTROLLED HYPERTENSION: ICD-10-CM

## 2022-03-22 DIAGNOSIS — Z48.89 ENCOUNTER FOR POST SURGICAL WOUND CHECK: Primary | ICD-10-CM

## 2022-03-22 DIAGNOSIS — C49.22 LIPOSARCOMA OF LEFT THIGH: ICD-10-CM

## 2022-03-22 LAB
BUN SERPL-MCNC: 19 MG/DL (ref 6–30)
CHLORIDE SERPL-SCNC: 102 MMOL/L (ref 95–110)
CREAT SERPL-MCNC: 1.2 MG/DL (ref 0.5–1.4)
GLUCOSE SERPL-MCNC: 122 MG/DL (ref 70–110)
HCT VFR BLD CALC: 41 %PCV (ref 36–54)
POC IONIZED CALCIUM: 1.23 MMOL/L (ref 1.06–1.42)
POC TCO2 (MEASURED): 29 MMOL/L (ref 23–29)
POTASSIUM BLD-SCNC: 4.4 MMOL/L (ref 3.5–5.1)
SAMPLE: ABNORMAL
SODIUM BLD-SCNC: 140 MMOL/L (ref 136–145)

## 2022-03-22 PROCEDURE — 80047 BASIC METABLC PNL IONIZED CA: CPT

## 2022-03-22 PROCEDURE — 99285 PR EMERGENCY DEPT VISIT,LEVEL V: ICD-10-PCS | Mod: ,,, | Performed by: PHYSICIAN ASSISTANT

## 2022-03-22 PROCEDURE — 99285 EMERGENCY DEPT VISIT HI MDM: CPT | Mod: ,,, | Performed by: PHYSICIAN ASSISTANT

## 2022-03-22 PROCEDURE — 99282 EMERGENCY DEPT VISIT SF MDM: CPT

## 2022-03-22 NOTE — ED PROVIDER NOTES
"Encounter Date: 3/22/2022       History     Chief Complaint   Patient presents with    Wound Check     Surg to L outer knee 2 yrs ago and now started bleeding     The patient is a 51 year old male, who has a past medical history of HTN and obesity. He is s/p surgical resection of a liposarcoma of left posterior thigh on 7/2020. He presents to the ER this evening for an urgent evaluation after he noticed clear fluid draining from surgical incision today while he was at work. He denies similar episodes in the past or any post-op complications. He denies any pain, redness, or swelling. He states that the fluid "looks like water" and denies any pus or purulent drainage. He denies any fever or chills. He denies any pain to palpation of area or pain with walking and ROM. He called and made an appointment to see his orthopedist, but is unable to get in until next month.          Review of patient's allergies indicates:  No Known Allergies  Past Medical History:   Diagnosis Date    Hypertension      Past Surgical History:   Procedure Laterality Date    HERNIA REPAIR      SURGICAL REMOVAL OF SARCOMA OF LOWER EXTREMITY Left 7/24/2020    Procedure: EXCISION, SARCOMA, LOWER EXTREMITY;  Surgeon: Yusef Lang MD;  Location: [facility] OR [address] [place] [address];  Service: Orthopedics;  Laterality: Left;  radical resection left thigh sarcoma     No family history on file.  Social History     Tobacco Use    Smoking status: Never Smoker    Smokeless tobacco: Never Used   Substance Use Topics    Alcohol use: Yes     Comment: 1-2 week    Drug use: No     Review of Systems   Constitutional: Negative for activity change, appetite change, chills, fatigue, fever and unexpected weight change.   HENT: Negative for congestion, rhinorrhea and sore throat.    Respiratory: Negative for cough and shortness of breath.    Cardiovascular: Negative for chest pain and leg swelling.   Gastrointestinal: Negative for abdominal pain, diarrhea, nausea and " vomiting.   Endocrine: Negative for polydipsia and polyuria.   Genitourinary: Negative for difficulty urinating and dysuria.   Musculoskeletal: Negative for arthralgias, gait problem, joint swelling and myalgias.   Skin: Negative for color change and rash.   Allergic/Immunologic: Negative for immunocompromised state.   Neurological: Negative for syncope, weakness, light-headedness, numbness and headaches.   Hematological: Negative for adenopathy.   Psychiatric/Behavioral: Negative for confusion.       Physical Exam     Initial Vitals [03/22/22 1714]   BP Pulse Resp Temp SpO2   (!) 191/85 79 18 99.4 °F (37.4 °C) 98 %      MAP       --         Physical Exam    Nursing note and vitals reviewed.  Constitutional: He appears well-developed and well-nourished. He is not diaphoretic. No distress.   Alert and ambulatory. Morbidly obese. No distress.    HENT:   Head: Normocephalic.   Eyes: Conjunctivae are normal.   Cardiovascular: Normal rate and intact distal pulses.   Pulmonary/Chest: No respiratory distress.   Abdominal: There is no abdominal tenderness.   Musculoskeletal:         General: Normal range of motion.      Comments: There is a well healed surgical incision of the posterior aspect of the left thigh s/p previous resection of liposarcoma that is not tender, erythematous, warm/hot, indurated, or fluctuant. No visible/obvious wound dehiscence. I am only able to express 1-2 drops of clear, non-purulent, non-bloody serous or sanguinous fluid with squeezing. See image.      Neurological: He is alert and oriented to person, place, and time. He has normal strength. No sensory deficit.   Skin: Skin is warm and dry. No rash and no abscess noted. No erythema.   Psychiatric: He has a normal mood and affect. His behavior is normal.             ED Course   Procedures  Labs Reviewed   ISTAT PROCEDURE - Abnormal; Notable for the following components:       Result Value    POC Glucose 122 (*)     All other components within  normal limits   HIV 1 / 2 ANTIBODY   HEPATITIS C ANTIBODY   ISTAT CHEM8     Results for orders placed or performed during the hospital encounter of 03/22/22   ISTAT PROCEDURE   Result Value Ref Range    POC Glucose 122 (H) 70 - 110 mg/dL    POC BUN 19 6 - 30 mg/dL    POC Creatinine 1.2 0.5 - 1.4 mg/dL    POC Sodium 140 136 - 145 mmol/L    POC Potassium 4.4 3.5 - 5.1 mmol/L    POC Chloride 102 95 - 110 mmol/L    POC TCO2 (MEASURED) 29 23 - 29 mmol/L    POC Ionized Calcium 1.23 1.06 - 1.42 mmol/L    POC Hematocrit 41 36 - 54 %PCV    Sample PATRICK             Imaging Results    None          Medications - No data to display  Medical Decision Making:   History:   Old Medical Records: I decided to obtain old medical records.  Initial Assessment:   50 yo male, s/p surgical resection of liposarcoma of LLE 7/2020 here for an urgent evaluation due to acute clear drainage from incision scar today. Denies any pain, redness, swelling, fever, or purulent drainage.   Differential Diagnosis:   Cancer recurrence/progression, Seroma, serosanguinous drainage, infection, etc   Clinical Tests:   Lab Tests: Ordered and Reviewed  Radiological Study: Ordered and Reviewed  ED Management:  Vital signs reviewed - elevated blood pressure noted - pt informed - pt reports hx of HTN and states that he did take BP meds today - attributes elevated BP reading to nervousness   Records reviewed   Low suspicion for infection - not red, swollen, hot, indurated, painful, or fluctuant   I discussed the case with the ER attending physician   I discussed the case with on call orthopedics - chart review shows that pt's last ortho appointment was approximately 18 months ago and that he was instructed to have surveillance imaging 6 months later that he did not complete. Ortho requesting MRI w/wo contrast of left femur now with discharge anticipated and have patient follow up with his orthopedist at next available.   Pt became anxious during MRI and refused to  proceed - requesting to be discharged - offered anxiety medication but patient declined - signing out ULISSES                           Clinical Impression:   Final diagnoses:  [Z48.89] Encounter for post surgical wound check (Primary)  [C49.22] Liposarcoma of left thigh  [I10] Uncontrolled hypertension          ED Disposition Condition    AMA               Shaheed Mendosa PA-C  03/22/22 2114       Shaheed Mendosa PA-C  03/22/22 2114

## 2022-03-23 NOTE — ED NOTES
Pt back from MRI, says he became anxious and could not complete. Messaged Md LISA offered to medicate pt prior to scan and try again. Pt says he needs to get home, but is willing to do outpatient scans if an option.

## 2022-03-23 NOTE — PLAN OF CARE
Tito Stanley is a 51 y.o. male with PMH HTN and obesity with relevant orthopedic history of left lower extremity liposarcoma s/p resection with Dr. Lang in July 2020. Patient presented to [facility] ED on 3/23pm after he noticed some mild serous saturation to the back of his pant leg while at work. He denies any acute injuries or any preceding symptoms. He denies any local pain, pain with ambulation, or pain with palpation over the surgical incision site. He has remained as per his neuro baseline since surgery. Patient last saw Dr. Lang on 1/15/2021 as part of his routine surveillance at which time MRI showed no local recurrence. He was scheduled to follow up 6 months later however was unable to make any further appointments due to work commitments. Patient states he has had no constitutional symptoms in the interim and presents out of an abundance of caution for evaluation.     On exam patient with well healed surgical incision at posterior aspect of left thigh. There was no palpable fluctuance or areas of induration/erythema/fluctuance along his entire healed incision. No signs of wound dehiscence. I was unable to express able purulence on deep palpation surrounding the incision. ED reported 1-2 drops of clear, non-purulent fluid expressed from distal aspect of incision on prior attempt.     I explained to the patient that given the period of time that has passed I would like to obtain an MRI of the left femur w/wo contrast. Patient attempted MRI but was unable to complete. He was offered medication to treat anxiety for a repeat scan but patient wished to pursue outpatient MRI and clinic follow up. I discussed with ED mid-level provider that patient should complete AMA paperwork prior to discharge home. He has clinic follow up scheduled with Dr. Lang on 4/29/22. We will attempt to get him into clinic sooner to arrange for his surveillance imaging with continued observation.       Reina Ventura MD  PGY-2  Department of  Orthopaedic Surgery  [facility]

## 2022-03-24 ENCOUNTER — TELEPHONE (OUTPATIENT)
Dept: ORTHOPEDICS | Facility: CLINIC | Age: 52
End: 2022-03-24
Payer: COMMERCIAL

## 2022-03-24 NOTE — TELEPHONE ENCOUNTER
----- Message from Paulette Meyer MA sent at 3/23/2022 11:29 AM CDT -----  Regarding: FW: speak with nurse  Contact: patient  S[place]  Please call pt to schedule    Thanks  Ольга   ----- Message -----  From: [place]eb [place]  Sent: 3/23/2022  11:22 AM CDT  To: [facility] Staff  Subject: speak with nurse                                 [phone]   please call patient need to speak with the nurse to schedule an Mri before doctor visit waiting on a call back thanks.     WE SPOKE  He can see Dr Lang tomorrow  He will need the MRI under sedation    He can discuss that with him tomorrow

## 2022-03-25 ENCOUNTER — TELEPHONE (OUTPATIENT)
Dept: ORTHOPEDICS | Facility: CLINIC | Age: 52
End: 2022-03-25
Payer: COMMERCIAL

## 2022-05-13 ENCOUNTER — TELEPHONE (OUTPATIENT)
Dept: ORTHOPEDICS | Facility: CLINIC | Age: 52
End: 2022-05-13
Payer: COMMERCIAL

## 2023-01-05 ENCOUNTER — HOSPITAL ENCOUNTER (EMERGENCY)
Facility: HOSPITAL | Age: 53
Discharge: HOME | End: 2023-01-05
Attending: EMERGENCY MEDICINE
Payer: COMMERCIAL

## 2023-01-05 VITALS
RESPIRATION RATE: 16 BRPM | BODY MASS INDEX: 43.54 KG/M2 | SYSTOLIC BLOOD PRESSURE: 157 MMHG | DIASTOLIC BLOOD PRESSURE: 76 MMHG | WEIGHT: 311 LBS | HEART RATE: 51 BPM | HEIGHT: 71 IN | OXYGEN SATURATION: 96 % | TEMPERATURE: 98 F

## 2023-01-05 DIAGNOSIS — M79.89 PAIN AND SWELLING OF LEFT LOWER LEG: ICD-10-CM

## 2023-01-05 DIAGNOSIS — M79.662 PAIN AND SWELLING OF LEFT LOWER LEG: ICD-10-CM

## 2023-01-05 DIAGNOSIS — L03.116 CELLULITIS OF LEFT LOWER EXTREMITY: Primary | ICD-10-CM

## 2023-01-05 LAB
ANION GAP SERPL CALC-SCNC: 10 MMOL/L (ref 8–16)
BASOPHILS # BLD AUTO: 0.05 K/UL (ref 0–0.2)
BASOPHILS NFR BLD: 0.4 % (ref 0–1.9)
BUN SERPL-MCNC: 8 MG/DL (ref 6–20)
CALCIUM SERPL-MCNC: 9.1 MG/DL (ref 8.7–10.5)
CHLORIDE SERPL-SCNC: 103 MMOL/L (ref 95–110)
CO2 SERPL-SCNC: 26 MMOL/L (ref 23–29)
CREAT SERPL-MCNC: 1 MG/DL (ref 0.5–1.4)
DIFFERENTIAL METHOD: ABNORMAL
EOSINOPHIL # BLD AUTO: 0.2 K/UL (ref 0–0.5)
EOSINOPHIL NFR BLD: 1.6 % (ref 0–8)
ERYTHROCYTE [DISTWIDTH] IN BLOOD BY AUTOMATED COUNT: 13.2 % (ref 11.5–14.5)
EST. GFR  (NO RACE VARIABLE): >60 ML/MIN/1.73 M^2
GLUCOSE SERPL-MCNC: 118 MG/DL (ref 70–110)
HCT VFR BLD AUTO: 38.9 % (ref 40–54)
HCV AB SERPL QL IA: NORMAL
HGB BLD-MCNC: 12.6 G/DL (ref 14–18)
HIV 1+2 AB+HIV1 P24 AG SERPL QL IA: NORMAL
IMM GRANULOCYTES # BLD AUTO: 0.09 K/UL (ref 0–0.04)
IMM GRANULOCYTES NFR BLD AUTO: 0.7 % (ref 0–0.5)
INR PPP: 1 (ref 0.8–1.2)
LYMPHOCYTES # BLD AUTO: 2.6 K/UL (ref 1–4.8)
LYMPHOCYTES NFR BLD: 21.1 % (ref 18–48)
MCH RBC QN AUTO: 29.8 PG (ref 27–31)
MCHC RBC AUTO-ENTMCNC: 32.4 G/DL (ref 32–36)
MCV RBC AUTO: 92 FL (ref 82–98)
MONOCYTES # BLD AUTO: 1.2 K/UL (ref 0.3–1)
MONOCYTES NFR BLD: 10.1 % (ref 4–15)
NEUTROPHILS # BLD AUTO: 8 K/UL (ref 1.8–7.7)
NEUTROPHILS NFR BLD: 66.1 % (ref 38–73)
NRBC BLD-RTO: 0 /100 WBC
PLATELET # BLD AUTO: 257 K/UL (ref 150–450)
PMV BLD AUTO: 9 FL (ref 9.2–12.9)
POTASSIUM SERPL-SCNC: 3.8 MMOL/L (ref 3.5–5.1)
PROTHROMBIN TIME: 10.5 SEC (ref 9–12.5)
RBC # BLD AUTO: 4.23 M/UL (ref 4.6–6.2)
SODIUM SERPL-SCNC: 139 MMOL/L (ref 136–145)
WBC # BLD AUTO: 12.16 K/UL (ref 3.9–12.7)

## 2023-01-05 PROCEDURE — 99284 EMERGENCY DEPT VISIT MOD MDM: CPT | Mod: ,,, | Performed by: PHYSICIAN ASSISTANT

## 2023-01-05 PROCEDURE — 85025 COMPLETE CBC W/AUTO DIFF WBC: CPT | Performed by: PHYSICIAN ASSISTANT

## 2023-01-05 PROCEDURE — 25000003 PHARM REV CODE 250: Performed by: PHYSICIAN ASSISTANT

## 2023-01-05 PROCEDURE — 99284 PR EMERGENCY DEPT VISIT,LEVEL IV: ICD-10-PCS | Mod: ,,, | Performed by: PHYSICIAN ASSISTANT

## 2023-01-05 PROCEDURE — 80048 BASIC METABOLIC PNL TOTAL CA: CPT | Performed by: PHYSICIAN ASSISTANT

## 2023-01-05 PROCEDURE — 85610 PROTHROMBIN TIME: CPT | Performed by: PHYSICIAN ASSISTANT

## 2023-01-05 PROCEDURE — 99284 EMERGENCY DEPT VISIT MOD MDM: CPT | Mod: 25

## 2023-01-05 PROCEDURE — 87389 HIV-1 AG W/HIV-1&-2 AB AG IA: CPT | Performed by: PHYSICIAN ASSISTANT

## 2023-01-05 PROCEDURE — 86803 HEPATITIS C AB TEST: CPT | Performed by: PHYSICIAN ASSISTANT

## 2023-01-05 RX ORDER — CEPHALEXIN 500 MG/1
500 CAPSULE ORAL EVERY 6 HOURS
Qty: 28 CAPSULE | Refills: 0 | Status: SHIPPED | OUTPATIENT
Start: 2023-01-05 | End: 2023-01-12

## 2023-01-05 RX ORDER — CEPHALEXIN 500 MG/1
500 CAPSULE ORAL
Status: COMPLETED | OUTPATIENT
Start: 2023-01-05 | End: 2023-01-05

## 2023-01-05 RX ORDER — SULFAMETHOXAZOLE AND TRIMETHOPRIM 800; 160 MG/1; MG/1
1 TABLET ORAL
Status: COMPLETED | OUTPATIENT
Start: 2023-01-05 | End: 2023-01-05

## 2023-01-05 RX ORDER — SULFAMETHOXAZOLE AND TRIMETHOPRIM 800; 160 MG/1; MG/1
1 TABLET ORAL 2 TIMES DAILY
Qty: 14 TABLET | Refills: 0 | Status: SHIPPED | OUTPATIENT
Start: 2023-01-05 | End: 2023-01-12

## 2023-01-05 RX ADMIN — CEPHALEXIN 500 MG: 500 CAPSULE ORAL at 12:01

## 2023-01-05 RX ADMIN — SULFAMETHOXAZOLE AND TRIMETHOPRIM 1 TABLET: 800; 160 TABLET ORAL at 12:01

## 2023-01-05 NOTE — Clinical Note
"Tito Willingham" Jaden was seen and treated in our emergency department on 1/5/2023.  He may return to work on 01/07/2023.       If you have any questions or concerns, please don't hesitate to call.      Laura HERNANDEZ    "

## 2023-01-05 NOTE — ED PROVIDER NOTES
"Encounter Date: 1/5/2023       History     Chief Complaint   Patient presents with    Leg Pain     L lower leg bruising     53 y/o M with history of HTN presents to the ED c/o LLE swelling and redness x 2 days.  He works for [employer] and is unsure if this is from his work boots rubbing on his leg.  He reports the edema has improved since onset but erythema has remained.  He reports some minimal leg pain that is "uncomfortable".  He is not on any blood thinners, denies any history of DVT. No recent abx use. No falls or trauma. He denies f/c, chest pain, SOB, abdominal pain, numbness, weakness, paresthesias.     The history is provided by the patient.   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 No pertinent family history.  Social History     Tobacco Use    Smoking status: Never    Smokeless tobacco: Never   Substance Use Topics    Alcohol use: Yes     Comment: 1-2 week    Drug use: No     Review of Systems   Constitutional:  Negative for chills and fever.   HENT:  Negative for congestion and sore throat.    Respiratory:  Negative for cough and shortness of breath.    Cardiovascular:  Positive for leg swelling. Negative for chest pain.   Gastrointestinal:  Negative for abdominal pain, constipation, diarrhea, nausea and vomiting.   Genitourinary:  Negative for dysuria and hematuria.   Musculoskeletal:  Positive for joint swelling and myalgias. Negative for back pain.   Skin:  Positive for color change. Negative for rash.   Neurological:  Negative for weakness, numbness and headaches. "   Psychiatric/Behavioral:  Negative for confusion.      Physical Exam     Initial Vitals [01/05/23 0848]   BP Pulse Resp Temp SpO2   (!) 185/83 89 18 97.8 °F (36.6 °C) 98 %      MAP       --         Physical Exam    Nursing note and vitals reviewed.  Constitutional: He appears well-developed and well-nourished.   HENT:   Head: Normocephalic and atraumatic.   Neck: Neck supple.   Normal range of motion.  Pulmonary/Chest: No respiratory distress.   Musculoskeletal:         General: No tenderness or edema. Normal range of motion.      Cervical back: Normal range of motion and neck supple.      Comments: Erythema noted to the LLE with petechiae more proximally. No calf tenderness. L pedal pulse 2+. Normal sensation.     Neurological: He is alert and oriented to person, place, and time. He has normal strength. No sensory deficit. GCS score is 15. GCS eye subscore is 4. GCS verbal subscore is 5. GCS motor subscore is 6.   Skin: There is erythema.           ED Course   Procedures  Labs Reviewed   CBC W/ AUTO DIFFERENTIAL - Abnormal; Notable for the following components:       Result Value    RBC 4.23 (*)     Hemoglobin 12.6 (*)     Hematocrit 38.9 (*)     MPV 9.0 (*)     Immature Granulocytes 0.7 (*)     Gran # (ANC) 8.0 (*)     Immature Grans (Abs) 0.09 (*)     Mono # 1.2 (*)     All other components within normal limits    Narrative:     Release to patient->Immediate   BASIC METABOLIC PANEL - Abnormal; Notable for the following components:    Glucose 118 (*)     All other components within normal limits    Narrative:     Release to patient->Immediate   HIV 1 / 2 ANTIBODY    Narrative:     Release to patient->Immediate   HEPATITIS C ANTIBODY    Narrative:     Release to patient->Immediate   PROTIME-INR    Narrative:     Release to patient->Immediate          Imaging Results              US Lower Extremity Veins Left (Final result)  Result time 01/05/23 12:04:31      Final result by Miguel Felix MD (01/05/23 12:04:31)                    Impression:      No evidence of deep venous thrombosis in the left lower extremity.    Prominent morphologically normal left groin lymph nodes, could be reactive.    Electronically signed by resident: Jaspal Gallo  Date:    01/05/2023  Time:    11:20    Electronically signed by: Miguel Felix MD  Date:    01/05/2023  Time:    12:04               Narrative:    EXAMINATION:  US LOWER EXTREMITY VEINS LEFT    CLINICAL HISTORY:  Pain in left lower leg    TECHNIQUE:  Duplex and color flow Doppler evaluation and graded compression of the left lower extremity veins was performed.    COMPARISON:  None    FINDINGS:  Left thigh veins: The common femoral, femoral, popliteal, upper greater saphenous, and deep femoral veins are patent and free of thrombus. The veins are normally compressible and have normal phasic flow and augmentation response.    Left calf veins: The visualized calf veins are patent.    Contralateral CFV: The contralateral (right) common femoral vein is patent and free of thrombus.    Miscellaneous: Prominent left groin nodes, largest measures 1.4 cm in short axis with normal fatty hilum.                                       Medications   sulfamethoxazole-trimethoprim 800-160mg per tablet 1 tablet (1 tablet Oral Given 1/5/23 1219)   cephALEXin capsule 500 mg (500 mg Oral Given 1/5/23 1219)     Medical Decision Making:   History:   Old Medical Records: I decided to obtain old medical records.  Old Records Summarized: records from clinic visits and records from previous admission(s).  Clinical Tests:   Lab Tests: Reviewed and Ordered  Radiological Study: Ordered and Reviewed     APC / Resident Notes:   51 y/o M with history of HTN presents to the ED c/o LLE swelling and redness x 2 days. Hypertensive. Well appearing. Erythema and warmth noted to the LLE, as the erythema extends more proximally appears to be petechiae that are non-blanching. No LE edema. No calf tenderness. Pedal pulse to the  L foot 2+ with normal sensation. DDx includes but is not limited to DVT, cellulitis, bruising, thrombocytopenia. Will get labs and LUE ultrasound.     No leukocytosis.  Mild anemia.  Platelet count normal.  BMP unremarkable.  INR normal.    No DVT noted on left lower extremity ultrasound.    Will treat with antibiotics.  He has not been on any oral antibiotics at home.  He is afebrile. I do not feel that he needs any further labs or imaging at this time. Stable for discharge.    He was discharged with prescriptions for keflex and bactrim (first doses given in the ED).  He will follow up with his PCP.  Strict ED return precautions given.  All of the patient's questions were answered.  I reviewed the patient's chart, labs, and imaging.                    Clinical Impression:   Final diagnoses:  [M79.662, M79.89] Pain and swelling of left lower leg  [L03.116] Cellulitis of left lower extremity (Primary)        ED Disposition Condition    Discharge Stable          ED Prescriptions       Medication Sig Dispense Start Date End Date Auth. Provider    sulfamethoxazole-trimethoprim 800-160mg (BACTRIM DS) 800-160 mg Tab Take 1 tablet by mouth 2 (two) times daily. for 7 days 14 tablet 1/5/2023 1/12/2023 Gabbie Quiñones PA-C    cephALEXin (KEFLEX) 500 MG capsule Take 1 capsule (500 mg total) by mouth every 6 (six) hours. for 7 days 28 capsule 1/5/2023 1/12/2023 Gabbie Quiñones PA-C          Follow-up Information       Follow up With Specialties Details Why Contact Info Additional Information    [facility]   [address]  [facility] [id number] [phone] [facility] Please park in surface lot and check in at central registration desk    [facility] Emergency Medicine  If symptoms worsen [address]  [facility] [id number] [phone]              Gabbie Quiñones PA-C  01/05/23 6103

## 2023-01-05 NOTE — DISCHARGE INSTRUCTIONS
Elevate the leg as much as possible to help with swelling. If you develop fever, worsening pain, worsening swelling, worsening redness - return to the ER.

## 2023-01-05 NOTE — ED NOTES
Patient identifiers verified and correct for Mr Stanley  C/C: Redness and swelling LLE SEE NN  APPEARANCE: awake and alert in NAD. PAIN  010  SKIN: warm, dry redness to LLE from mabove mankle to under knee wioth splotches near knee, circumforal reness to mid lower shin   MUSCULOSKELETAL: Patient moving all extremities spontaneously, no obvious swelling or deformities noted. Ambulates independently.  RESPIRATORY: Denies shortness of breath.Respirations unlabored. Denies fevers  CARDIAC: Denies CP, 2+ distal pulses; no peripheral edema  ABDOMEN: S/ND/NT, Denies nausea  : voids spontaneously, denies difficulty  Neurologic: AAO x 4; follows commands equal strength in all extremities; denies numbness/tingling. Denies dizziness  Deneis new weakness

## 2023-01-05 NOTE — ED NOTES
Patient with redness LLE x 2 days, warmth and pain noted, states his boot may be  rubbing on his leg. Sates swelling better and redness has not gone up his leg. Denies fevers No current antibiotics

## 2025-06-16 ENCOUNTER — TELEPHONE (OUTPATIENT)
Dept: ORTHOPEDICS | Facility: CLINIC | Age: 55
End: 2025-06-16
Payer: COMMERCIAL

## 2025-06-16 NOTE — TELEPHONE ENCOUNTER
I called the patient today regarding his voice message. The patient is scheduled to see Dr. Hurst on 7/15 for bilateral knees. The patient verbalized understanding and has no further questions.

## 2025-07-02 DIAGNOSIS — M25.562 ACUTE PAIN OF BOTH KNEES: Primary | ICD-10-CM

## 2025-07-02 DIAGNOSIS — M25.561 ACUTE PAIN OF BOTH KNEES: Primary | ICD-10-CM

## (undated) DEVICE — NDL MAYO 2

## (undated) DEVICE — BLADE PEAK PLASMA

## (undated) DEVICE — SEE MEDLINE ITEM 157131

## (undated) DEVICE — DRAPE STERI U-SHAPED 47X51IN

## (undated) DEVICE — ELECTRODE REM PLYHSV RETURN 9

## (undated) DEVICE — SPONGE LAP 18X18 PREWASHED

## (undated) DEVICE — STOCKINET 4INX48

## (undated) DEVICE — SUT VICRYL BR 1 GEN 27 CT-1

## (undated) DEVICE — SOL IRR NACL .9% 3000ML

## (undated) DEVICE — SEE MEDLINE ITEM 146298

## (undated) DEVICE — PAD CAST SPECIALIST STRL 6

## (undated) DEVICE — STAPLER SKIN PROXIMATE WIDE

## (undated) DEVICE — SUT CTD VICRYL 0 UND BR CPX

## (undated) DEVICE — SEE MEDLINE ITEM 146271

## (undated) DEVICE — KIT PREVENA PLUS

## (undated) DEVICE — DRAPE INCISE IOBAN 2 23X17IN

## (undated) DEVICE — APPLICATOR CHLORAPREP ORN 26ML

## (undated) DEVICE — SUT CTD VICRYL UND BR CP-1

## (undated) DEVICE — Device

## (undated) DEVICE — CUFF TOURNIQUET STER DIS 34

## (undated) DEVICE — SEE MEDLINE ITEM 146345

## (undated) DEVICE — GAUZE SPONGE 4X4 12PLY

## (undated) DEVICE — TAPE MEDIPORE 4IN X 2YDS

## (undated) DEVICE — LOOP VESSEL BLUE MAXI

## (undated) DEVICE — TRAY MINOR ORTHO

## (undated) DEVICE — TAPE SURG DURAPORE 2 X10YD

## (undated) DEVICE — CLOSURE SKIN STERI STRIP 1/2X4